# Patient Record
Sex: FEMALE | Race: WHITE | Employment: OTHER | URBAN - METROPOLITAN AREA
[De-identification: names, ages, dates, MRNs, and addresses within clinical notes are randomized per-mention and may not be internally consistent; named-entity substitution may affect disease eponyms.]

---

## 2017-07-19 ENCOUNTER — HOSPITAL ENCOUNTER (INPATIENT)
Age: 82
LOS: 6 days | Discharge: NURSING, REHAB OR LONG-TERM CARE | DRG: 065 | End: 2017-07-25
Attending: EMERGENCY MEDICINE | Admitting: HOSPITALIST
Payer: MEDICARE

## 2017-07-19 ENCOUNTER — APPOINTMENT (OUTPATIENT)
Dept: CT IMAGING | Age: 82
DRG: 065 | End: 2017-07-19
Attending: EMERGENCY MEDICINE
Payer: MEDICARE

## 2017-07-19 DIAGNOSIS — S06.5XAA SUBDURAL HEMATOMA: Primary | ICD-10-CM

## 2017-07-19 DIAGNOSIS — F03.90 DEMENTIA WITHOUT BEHAVIORAL DISTURBANCE, UNSPECIFIED DEMENTIA TYPE: ICD-10-CM

## 2017-07-19 DIAGNOSIS — R53.81 DEBILITY: ICD-10-CM

## 2017-07-19 DIAGNOSIS — N30.00 ACUTE CYSTITIS WITHOUT HEMATURIA: ICD-10-CM

## 2017-07-19 DIAGNOSIS — G93.40 ACUTE ENCEPHALOPATHY: ICD-10-CM

## 2017-07-19 DIAGNOSIS — Z71.89 GOALS OF CARE, COUNSELING/DISCUSSION: ICD-10-CM

## 2017-07-19 PROBLEM — I61.9 ICH (INTRACEREBRAL HEMORRHAGE) (HCC): Status: ACTIVE | Noted: 2017-07-19

## 2017-07-19 LAB
ABO + RH BLD: NORMAL
ALBUMIN SERPL BCP-MCNC: 3.1 G/DL (ref 3.5–5)
ALBUMIN/GLOB SERPL: 0.9 {RATIO} (ref 1.1–2.2)
ALP SERPL-CCNC: 90 U/L (ref 45–117)
ALT SERPL-CCNC: 11 U/L (ref 12–78)
AMPHET UR QL SCN: NEGATIVE
ANION GAP BLD CALC-SCNC: 7 MMOL/L (ref 5–15)
APPEARANCE UR: CLEAR
APTT PPP: 25.4 SEC (ref 22.1–32.5)
ARTERIAL PATENCY WRIST A: YES
AST SERPL W P-5'-P-CCNC: 12 U/L (ref 15–37)
ATRIAL RATE: 61 BPM
BACTERIA URNS QL MICRO: NEGATIVE /HPF
BARBITURATES UR QL SCN: NEGATIVE
BASE DEFICIT BLD-SCNC: 2 MMOL/L
BASOPHILS # BLD AUTO: 0 K/UL (ref 0–0.1)
BASOPHILS # BLD: 1 % (ref 0–1)
BDY SITE: ABNORMAL
BENZODIAZ UR QL: NEGATIVE
BILIRUB SERPL-MCNC: 1 MG/DL (ref 0.2–1)
BILIRUB UR QL: NEGATIVE
BLOOD GROUP ANTIBODIES SERPL: NORMAL
BUN SERPL-MCNC: 24 MG/DL (ref 6–20)
BUN/CREAT SERPL: 20 (ref 12–20)
CALCIUM SERPL-MCNC: 8.5 MG/DL (ref 8.5–10.1)
CALCULATED P AXIS, ECG09: 79 DEGREES
CALCULATED R AXIS, ECG10: -11 DEGREES
CALCULATED T AXIS, ECG11: 142 DEGREES
CANNABINOIDS UR QL SCN: NEGATIVE
CHLORIDE SERPL-SCNC: 104 MMOL/L (ref 97–108)
CK SERPL-CCNC: 61 U/L (ref 26–192)
CO2 SERPL-SCNC: 28 MMOL/L (ref 21–32)
COCAINE UR QL SCN: NEGATIVE
COLOR UR: ABNORMAL
CREAT SERPL-MCNC: 1.19 MG/DL (ref 0.55–1.02)
DIAGNOSIS, 93000: NORMAL
DRUG SCRN COMMENT,DRGCM: NORMAL
EOSINOPHIL # BLD: 0.3 K/UL (ref 0–0.4)
EOSINOPHIL NFR BLD: 4 % (ref 0–7)
EPITH CASTS URNS QL MICRO: ABNORMAL /LPF
ERYTHROCYTE [DISTWIDTH] IN BLOOD BY AUTOMATED COUNT: 14.6 % (ref 11.5–14.5)
GAS FLOW.O2 O2 DELIVERY SYS: ABNORMAL L/MIN
GLOBULIN SER CALC-MCNC: 3.5 G/DL (ref 2–4)
GLUCOSE SERPL-MCNC: 83 MG/DL (ref 65–100)
GLUCOSE UR STRIP.AUTO-MCNC: NEGATIVE MG/DL
HCO3 BLD-SCNC: 21.6 MMOL/L (ref 22–26)
HCT VFR BLD AUTO: 38.7 % (ref 35–47)
HGB BLD-MCNC: 12.5 G/DL (ref 11.5–16)
HGB UR QL STRIP: NEGATIVE
HYALINE CASTS URNS QL MICRO: ABNORMAL /LPF (ref 0–5)
INR PPP: 1.1 (ref 0.9–1.1)
KETONES UR QL STRIP.AUTO: NEGATIVE MG/DL
LEUKOCYTE ESTERASE UR QL STRIP.AUTO: ABNORMAL
LYMPHOCYTES # BLD AUTO: 39 % (ref 12–49)
LYMPHOCYTES # BLD: 2.8 K/UL (ref 0.8–3.5)
MCH RBC QN AUTO: 28.7 PG (ref 26–34)
MCHC RBC AUTO-ENTMCNC: 32.3 G/DL (ref 30–36.5)
MCV RBC AUTO: 88.8 FL (ref 80–99)
METHADONE UR QL: NEGATIVE
MONOCYTES # BLD: 0.8 K/UL (ref 0–1)
MONOCYTES NFR BLD AUTO: 11 % (ref 5–13)
NEUTS SEG # BLD: 3.4 K/UL (ref 1.8–8)
NEUTS SEG NFR BLD AUTO: 45 % (ref 32–75)
NITRITE UR QL STRIP.AUTO: NEGATIVE
OPIATES UR QL: NEGATIVE
P-R INTERVAL, ECG05: 232 MS
PCO2 BLD: 30.5 MMHG (ref 35–45)
PCP UR QL: NEGATIVE
PH BLD: 7.46 [PH] (ref 7.35–7.45)
PH UR STRIP: 5.5 [PH] (ref 5–8)
PLATELET # BLD AUTO: 281 K/UL (ref 150–400)
PO2 BLD: 85 MMHG (ref 80–100)
POTASSIUM SERPL-SCNC: 3.4 MMOL/L (ref 3.5–5.1)
PROT SERPL-MCNC: 6.6 G/DL (ref 6.4–8.2)
PROT UR STRIP-MCNC: NEGATIVE MG/DL
PROTHROMBIN TIME: 11.6 SEC (ref 9–11.1)
Q-T INTERVAL, ECG07: 444 MS
QRS DURATION, ECG06: 90 MS
QTC CALCULATION (BEZET), ECG08: 446 MS
RBC # BLD AUTO: 4.36 M/UL (ref 3.8–5.2)
RBC #/AREA URNS HPF: ABNORMAL /HPF (ref 0–5)
SAO2 % BLD: 97 % (ref 92–97)
SODIUM SERPL-SCNC: 139 MMOL/L (ref 136–145)
SP GR UR REFRACTOMETRY: 1.02 (ref 1–1.03)
SPECIMEN EXP DATE BLD: NORMAL
SPECIMEN TYPE: ABNORMAL
THERAPEUTIC RANGE,PTTT: NORMAL SECS (ref 58–77)
TOTAL RESP. RATE, ITRR: 24
TROPONIN I SERPL-MCNC: <0.04 NG/ML
UA: UC IF INDICATED,UAUC: ABNORMAL
UROBILINOGEN UR QL STRIP.AUTO: 0.2 EU/DL (ref 0.2–1)
VENTRICULAR RATE, ECG03: 61 BPM
WBC # BLD AUTO: 7.3 K/UL (ref 3.6–11)
WBC URNS QL MICRO: ABNORMAL /HPF (ref 0–4)

## 2017-07-19 PROCEDURE — 65610000006 HC RM INTENSIVE CARE

## 2017-07-19 PROCEDURE — 82550 ASSAY OF CK (CPK): CPT | Performed by: EMERGENCY MEDICINE

## 2017-07-19 PROCEDURE — 80053 COMPREHEN METABOLIC PANEL: CPT | Performed by: EMERGENCY MEDICINE

## 2017-07-19 PROCEDURE — 74011636320 HC RX REV CODE- 636/320: Performed by: EMERGENCY MEDICINE

## 2017-07-19 PROCEDURE — 36600 WITHDRAWAL OF ARTERIAL BLOOD: CPT

## 2017-07-19 PROCEDURE — 74011250636 HC RX REV CODE- 250/636: Performed by: HOSPITALIST

## 2017-07-19 PROCEDURE — 87086 URINE CULTURE/COLONY COUNT: CPT | Performed by: EMERGENCY MEDICINE

## 2017-07-19 PROCEDURE — 84484 ASSAY OF TROPONIN QUANT: CPT | Performed by: EMERGENCY MEDICINE

## 2017-07-19 PROCEDURE — 70450 CT HEAD/BRAIN W/O DYE: CPT

## 2017-07-19 PROCEDURE — 94762 N-INVAS EAR/PLS OXIMTRY CONT: CPT

## 2017-07-19 PROCEDURE — 77030011943

## 2017-07-19 PROCEDURE — 85025 COMPLETE CBC W/AUTO DIFF WBC: CPT | Performed by: EMERGENCY MEDICINE

## 2017-07-19 PROCEDURE — 36415 COLL VENOUS BLD VENIPUNCTURE: CPT | Performed by: HOSPITALIST

## 2017-07-19 PROCEDURE — 70460 CT HEAD/BRAIN W/DYE: CPT

## 2017-07-19 PROCEDURE — 74011000258 HC RX REV CODE- 258: Performed by: EMERGENCY MEDICINE

## 2017-07-19 PROCEDURE — 74011250636 HC RX REV CODE- 250/636: Performed by: EMERGENCY MEDICINE

## 2017-07-19 PROCEDURE — 86900 BLOOD TYPING SEROLOGIC ABO: CPT | Performed by: EMERGENCY MEDICINE

## 2017-07-19 PROCEDURE — 76450000000

## 2017-07-19 PROCEDURE — 74011000250 HC RX REV CODE- 250: Performed by: EMERGENCY MEDICINE

## 2017-07-19 PROCEDURE — 93005 ELECTROCARDIOGRAM TRACING: CPT

## 2017-07-19 PROCEDURE — 85730 THROMBOPLASTIN TIME PARTIAL: CPT | Performed by: EMERGENCY MEDICINE

## 2017-07-19 PROCEDURE — 99285 EMERGENCY DEPT VISIT HI MDM: CPT

## 2017-07-19 PROCEDURE — 85610 PROTHROMBIN TIME: CPT | Performed by: EMERGENCY MEDICINE

## 2017-07-19 PROCEDURE — 82803 BLOOD GASES ANY COMBINATION: CPT

## 2017-07-19 PROCEDURE — 81001 URINALYSIS AUTO W/SCOPE: CPT | Performed by: EMERGENCY MEDICINE

## 2017-07-19 PROCEDURE — 80307 DRUG TEST PRSMV CHEM ANLYZR: CPT | Performed by: EMERGENCY MEDICINE

## 2017-07-19 PROCEDURE — 51701 INSERT BLADDER CATHETER: CPT

## 2017-07-19 RX ORDER — MENTHOL AND ZINC OXIDE .44; 20.625 G/100G; G/100G
OINTMENT TOPICAL 3 TIMES DAILY
COMMUNITY
Start: 2017-03-24 | End: 2019-09-19 | Stop reason: DRUGHIGH

## 2017-07-19 RX ORDER — LOPERAMIDE HYDROCHLORIDE 2 MG/1
2 CAPSULE ORAL
COMMUNITY
End: 2019-09-19

## 2017-07-19 RX ORDER — MIRTAZAPINE 15 MG/1
7.5 TABLET, FILM COATED ORAL
COMMUNITY
End: 2019-09-19 | Stop reason: DRUGHIGH

## 2017-07-19 RX ORDER — DONEPEZIL HYDROCHLORIDE 10 MG/1
10 TABLET, FILM COATED ORAL
COMMUNITY
End: 2019-09-19

## 2017-07-19 RX ORDER — ATORVASTATIN CALCIUM 10 MG/1
10 TABLET, FILM COATED ORAL EVERY EVENING
COMMUNITY
End: 2019-09-19

## 2017-07-19 RX ORDER — ACETAMINOPHEN 325 MG/1
650 TABLET ORAL
COMMUNITY

## 2017-07-19 RX ORDER — SODIUM CHLORIDE 0.9 % (FLUSH) 0.9 %
10 SYRINGE (ML) INJECTION
Status: COMPLETED | OUTPATIENT
Start: 2017-07-19 | End: 2017-07-19

## 2017-07-19 RX ORDER — LOSARTAN POTASSIUM 100 MG/1
100 TABLET ORAL DAILY
COMMUNITY
Start: 2017-01-26 | End: 2019-09-19 | Stop reason: DRUGHIGH

## 2017-07-19 RX ORDER — POTASSIUM CHLORIDE 7.45 MG/ML
10 INJECTION INTRAVENOUS
Status: COMPLETED | OUTPATIENT
Start: 2017-07-19 | End: 2017-07-19

## 2017-07-19 RX ORDER — SODIUM CHLORIDE 9 MG/ML
75 INJECTION, SOLUTION INTRAVENOUS CONTINUOUS
Status: DISPENSED | OUTPATIENT
Start: 2017-07-19 | End: 2017-07-20

## 2017-07-19 RX ORDER — FOLIC ACID 1 MG/1
1 TABLET ORAL DAILY
COMMUNITY
Start: 2017-01-26 | End: 2019-09-19

## 2017-07-19 RX ORDER — LORAZEPAM 0.5 MG/1
0.5 TABLET ORAL
Status: ON HOLD | COMMUNITY
End: 2017-07-24

## 2017-07-19 RX ORDER — CITALOPRAM 20 MG/1
20 TABLET, FILM COATED ORAL EVERY EVENING
COMMUNITY
End: 2017-07-25

## 2017-07-19 RX ORDER — SODIUM CHLORIDE 0.9 % (FLUSH) 0.9 %
5-10 SYRINGE (ML) INJECTION AS NEEDED
Status: DISCONTINUED | OUTPATIENT
Start: 2017-07-19 | End: 2017-07-25 | Stop reason: HOSPADM

## 2017-07-19 RX ORDER — TRAZODONE HYDROCHLORIDE 50 MG/1
25 TABLET ORAL
COMMUNITY
End: 2019-09-19

## 2017-07-19 RX ORDER — SODIUM CHLORIDE 0.9 % (FLUSH) 0.9 %
5-10 SYRINGE (ML) INJECTION EVERY 8 HOURS
Status: DISCONTINUED | OUTPATIENT
Start: 2017-07-19 | End: 2017-07-22

## 2017-07-19 RX ADMIN — Medication 10 ML: at 22:00

## 2017-07-19 RX ADMIN — POTASSIUM CHLORIDE 10 MEQ: 10 INJECTION, SOLUTION INTRAVENOUS at 16:18

## 2017-07-19 RX ADMIN — POTASSIUM CHLORIDE 10 MEQ: 10 INJECTION, SOLUTION INTRAVENOUS at 18:43

## 2017-07-19 RX ADMIN — CEFTRIAXONE 1 G: 1 INJECTION, POWDER, FOR SOLUTION INTRAMUSCULAR; INTRAVENOUS at 15:47

## 2017-07-19 RX ADMIN — SODIUM CHLORIDE 100 ML: 900 INJECTION, SOLUTION INTRAVENOUS at 15:39

## 2017-07-19 RX ADMIN — Medication 10 ML: at 15:27

## 2017-07-19 RX ADMIN — SODIUM CHLORIDE 500 ML: 900 INJECTION, SOLUTION INTRAVENOUS at 14:57

## 2017-07-19 RX ADMIN — Medication 10 ML: at 15:39

## 2017-07-19 RX ADMIN — IOPAMIDOL 70 ML: 612 INJECTION, SOLUTION INTRAVENOUS at 15:39

## 2017-07-19 RX ADMIN — POTASSIUM CHLORIDE 10 MEQ: 10 INJECTION, SOLUTION INTRAVENOUS at 17:28

## 2017-07-19 RX ADMIN — SODIUM CHLORIDE 75 ML/HR: 900 INJECTION, SOLUTION INTRAVENOUS at 16:18

## 2017-07-19 RX ADMIN — SODIUM CHLORIDE 5 MG/HR: 900 INJECTION, SOLUTION INTRAVENOUS at 15:51

## 2017-07-19 NOTE — CONSULTS
[address] [place] [facility] [place] [address]   [address]       Name:  Chantal Gibbs   MR#:  [id number]   :  10/07/1923   Account #:  [de-identified]    Date of Consultation:  2017   Date of Adm:  2017       REASON FOR CONSULTATION: Subdural hematoma. HISTORY OF PRESENT ILLNESS: This is a 80-year-old woman who   is a resident at an assisted living facility with memory care wing. She   has a history of dementia. 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 Her son believes that was the   start of her decline. Today she was brought to the emergency room for   altered mental status. Workup included a head CT. This did show   bilateral subdural collections. They are not acute, they are subacute   and chronic without any significant mass effect. She has not had any   seizure activity. She was started on Cardene because of elevated   blood pressure and I was asked to see her in consultation. PAST MEDICAL HISTORY: Hypertension, dementia, psychiatric   disorder, hypercholesterolemia. MEDICATIONS PRIOR TO ADMISSION   1. Lipitor 10 mg every day. 2. Celexa 20 mg at bedtime. 3. Aricept 10 mg daily. 4. Folvite 1 mg every day. 5. Cozaar 100 mg every day. 6. Remeron 7.5 mg at bedtime. 7. Ativan 0.5 mg p.r.n.   8. Trazodone 25 mg p.r.n. FAMILY HISTORY: Unknown. PAST SURGICAL HISTORY: Left craniotomy, other surgeries   unknown. REVIEW OF SYSTEMS: Unobtainable from patient. ALLERGIES: NO KNOWN DRUG ALLERGIES. SOCIAL HISTORY: She is a resident at an assisted living memory   care unit, no tobacco or alcohol use. PHYSICAL EXAMINATION   VITAL SIGNS: Temperature 98.4, blood pressure 147/55, pulse is 62. GENERAL: This is a well-developed, elderly woman who is examined   lying in a hospital bed. She is able to open her eyes. She can tell me   her name. She knows her date of birth.  She does not know where she   is and she does not know today's date. She does not have any   complaints except for being cold. NEUROLOGIC: She has fluent speech. She is able to follow   commands in all 4 extremities. She has good strength throughout. No   focal neurologic deficit. SKIN: No bruising. IMAGING STUDIES: Shows a head CT that has 2 bilateral subdural   collections that are acute on chronic, without any significant mass   effect, no midline shift. Basal cisterns are open. There is sign of a   previous left craniotomy. ASSESSMENT AND PLAN: This is a 77-year-old woman who had an   altered mental status according to her living facility. She does have two   subacute chronic subdurals; however, they are not causing any mass   effect. I do not think this is causing any neurologic deficit. She may   benefit from repeat imaging in the morning. I would hold any   anticoagulation. I agree with aggressive blood pressure management. Thank you for this consultation.         MD Maryellen Noble / Nitin Bautista   D:  07/19/2017   17:28   T:  07/19/2017   19:42   Job #:  [id number]

## 2017-07-19 NOTE — ED TRIAGE NOTES
Triage: Pt arrives via [facility] from [facility] with AMS and bradycardia in the 40-50s with Sinus rhythm. BG 85. Hx of  Dementia, HTN, depression and anxiety. Intermittently responds to pain. Temp on arrival 97.8 orally.

## 2017-07-19 NOTE — CONSULTS
Palliative Medicine Consult  Phan: [phone]    Patient Name: Terrence Sebastian  YOB: 1923    Date of Initial Consult: 17  Reason for Consult: care decisions  Requesting Provider: Claudell Song   Primary Care Physician: Annika Michele MD      SUMMARY:   Terrence Sebastian is a 80 y.o. with a past history of dementia, htn, psychiatric disorder, who was admitted on 2017 from NH with a diagnosis of AMS, found to have bilateral chronic subdural collections, superimposed acute right subdural hematoma vs prominent cortical vein. Current medical issues leading to Palliative Medicine involvement include: subdural collections, dementia, debility, goals of care. PALLIATIVE DIAGNOSES:   1. Acute encephalopathy  2. Dementia  3. Debility  4. Goals of care       PLAN:   1. Pt is complaining of \"pain all over\", but unreliable historian, reassess, consider scheduled tylenol first line  2. Pt lacks capacity for major medical decisions  3. Discussed pt and condition with son Marin Biggs, including bilateral subdural collections, possible small acute right subdural (but also possible no acute component), involvement of neurosurgery service and no surgical indication at this point, plan for close bp and neuro monitoring  4. We discussed pt's recent history; she's lived in [address] [place] [address],[address] for 6 or 7 months, overall has been pretty stable; she ambulates with walker, occ needs assist with ADLs, been eating fairly well with no weight loss noted; she is usually able to recognize family, but may not be oriented to place / time, often thinks  family members still alive  11. We discussed about advance care planning; pt has not completed AMD or other documents such as DNR in past  6. Discussed with him about code status, risks / benefits / alternatives to full code;  Jamaica Soto feels sure that pt would not want CPR and that his brother, Armando Vicente, would be in agreement; he is OK with me updating code status to DNR, order entered, and eventually pt will need dDNR completed  7. Agreed to follow up with Moises tomorrow for updates  8. Initial consult note routed to primary continuity provider  9. Communicated plan of care with: Palliative IDT       GOALS OF CARE / TREATMENT PREFERENCES:   [====Goals of Care====]  GOALS OF CARE:  Patient / health care proxy stated goals: TBD      TREATMENT PREFERENCES:   Code Status: Full Code    Advance Care Planning:  No flowsheet data found. Other:    The palliative care team has discussed with patient / health care proxy about goals of care / treatment preferences for patient.  [====Goals of Care====]         HISTORY:     History obtained from: pt, chart, son    CHIEF COMPLAINT: \"I'm cold\"    HPI/SUBJECTIVE:    The patient is:   [x] Verbal and participatory  [] Non-participatory due to:   80 yof with above history and current issues. On assessment, pt perseverating saying \"I'm cold\". She knows her name and family member's names. She indicates she is having pain \"all over\" but is not able to elaborate. History limited by pt AMS. Clinical Pain Assessment (nonverbal scale for severity on nonverbal patients):   [++++ Clinical Pain Assessment++++]  [++++Pain Severity++++]: Pain: 5  [++++Pain Character++++]: unknown  [++++Pain Duration++++]: unknown  [++++Pain Effect++++]: unknown  [++++Pain Factors++++]: unknown  [++++Pain Frequency++++]: unknown  [++++Pain Location++++]: \"all over\"  [++++ Clinical Pain Assessment++++]     FUNCTIONAL ASSESSMENT:     Palliative Performance Scale (PPS):  PPS: 30       PSYCHOSOCIAL/SPIRITUAL SCREENING:     Advance Care Planning:  No flowsheet data found.      Any spiritual / Quaker concerns:  [] Yes /  [] No    Caregiver Burnout:  [] Yes /  [x] No /  [] No Caregiver Present      Anticipatory grief assessment:   [] Normal  / [] Maladaptive       ESAS Anxiety:      ESAS Depression:       cannot assess due to pt factors for multiple above     REVIEW OF SYSTEMS:     Positive and pertinent negative findings in ROS are noted above in HPI. The following systems were [] reviewed / [x] unable to be reviewed as noted in HPI  Other findings are noted below. Systems: constitutional, ears/nose/mouth/throat, respiratory, gastrointestinal, genitourinary, musculoskeletal, integumentary, neurologic, psychiatric, endocrine. Positive findings noted below. Modified ESAS Completed by: provider   Fatigue: 5 Drowsiness: 5     Pain: 5                                PHYSICAL EXAM:     From RN flowsheet:  Wt Readings from Last 3 Encounters:   07/19/17 127 lb 14.4 oz (58 kg)     Blood pressure 147/55, pulse 62, temperature 98.4 °F (36.9 °C), resp. rate 26, height 5' 6\" (1.676 m), weight 127 lb 14.4 oz (58 kg), SpO2 98 %. Pain Scale 1: Adult Nonverbal Pain Scale                    Last bowel movement, if known:     Constitutional: restless, perseverates saying \"I'm cold\"  Eyes: pupils equal, anicteric  ENMT: no nasal discharge, dry mucous membranes  Cardiovascular: regular rhythm, distal pulses intact  Respiratory: breathing not labored, symmetric  Gastrointestinal: soft non-tender, +bowel sounds  Musculoskeletal: no deformity, no tenderness to palpation  Skin: warm, dry  Neurologic: following commands, moving all extremities but generalized weakness noted  Psychiatric: restless, cannot assess due to pt factors for remainder  Other:       HISTORY:     Principal Problem:    SDH (subdural hematoma) ([facility]) (7/19/2017)    Active Problems:    ICH (intracerebral hemorrhage) ([facility] [address] (7/19/2017)      Past Medical History:   Diagnosis Date    Dementia 2017    Hypertension     Psychiatric disorder       History reviewed. No pertinent surgical history. History reviewed. No pertinent family history. History reviewed, no pertinent family history.   Social History   Substance Use Topics    Smoking status: Not on file    Smokeless tobacco: Not on file    Alcohol use No     No Known Allergies   Current Facility-Administered Medications   Medication Dose Route Frequency    niCARdipine (CARDENE) 25 mg in 0.9% sodium chloride 250 mL infusion  5-15 mg/hr IntraVENous TITRATE    sodium chloride (NS) flush 5-10 mL  5-10 mL IntraVENous Q8H    sodium chloride (NS) flush 5-10 mL  5-10 mL IntraVENous PRN    potassium chloride 10 mEq in 100 ml IVPB  10 mEq IntraVENous Q1H    0.9% sodium chloride infusion  75 mL/hr IntraVENous CONTINUOUS     Current Outpatient Prescriptions   Medication Sig    atorvastatin (LIPITOR) 10 mg tablet Take 10 mg by mouth every evening. Indications: hyperlipidemia    Menthol-Zinc Oxide (CALMOSEPTINE) 0.44-20.6 % oint Apply  to affected area three (3) times daily.  citalopram (CELEXA) 20 mg tablet Take 20 mg by mouth every evening. Indications: ANXIETY WITH DEPRESSION    donepezil (ARICEPT) 10 mg tablet Take 10 mg by mouth nightly. 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  losartan (COZAAR) 100 mg tablet Take 100 mg by mouth daily. Indications: hypertension    mirtazapine (REMERON) 15 mg tablet Take 7.5 mg by mouth nightly. Indications: major depressive disorder    LORazepam (ATIVAN) 0.5 mg tablet Take 0.5 mg by mouth two (2) times daily as needed (agitation).  traZODone (DESYREL) 50 mg tablet Take 25 mg by mouth nightly as needed. Indications: insomnia associated with depression    acetaminophen (TYLENOL) 325 mg tablet Take 650 mg by mouth every four (4) hours as needed for Pain. Indications: Pain    loperamide (ANTI-DIARRHEAL, LOPERAMIDE,) 2 mg capsule Take 2 mg by mouth four (4) times daily as needed for Diarrhea.           LAB AND IMAGING FINDINGS:     Lab Results   Component Value Date/Time    WBC 7.3 07/19/2017 01:47 PM    HGB 12.5 07/19/2017 01:47 PM    PLATELET 844 52/67/7666 01:47 PM     Lab Results   Component Value Date/Time    Sodium 139 07/19/2017 01:47 PM    Potassium 3.4 07/19/2017 01:47 PM    Chloride 104 07/19/2017 01:47 PM    CO2 28 07/19/2017 01:47 PM    BUN 24 07/19/2017 01:47 PM    Creatinine 1.19 07/19/2017 01:47 PM    Calcium 8.5 07/19/2017 01:47 PM      Lab Results   Component Value Date/Time    AST (SGOT) 12 07/19/2017 01:47 PM    Alk. phosphatase 90 07/19/2017 01:47 PM    Protein, total 6.6 07/19/2017 01:47 PM    Albumin 3.1 07/19/2017 01:47 PM    Globulin 3.5 07/19/2017 01:47 PM     Lab Results   Component Value Date/Time    INR 1.1 07/19/2017 01:47 PM    Prothrombin time 11.6 07/19/2017 01:47 PM    aPTT 25.4 07/19/2017 01:47 PM      No results found for: IRON, FE, TIBC, IBCT, PSAT, FERR   No results found for: PH, PCO2, PO2  No components found for: GLPOC   No results found for: CPK, CKMB             Total time:   Counseling / coordination time, spent as noted above:   > 50% counseling / coordination?:     Prolonged service was provided for  []30 min   []75 min in face to face time in the presence of the patient, spent as noted above. Time Start:   Time End:   Note: this can only be billed with 91652 (initial) or 17171 (follow up). If multiple start / stop times, list each separately.

## 2017-07-19 NOTE — CONSULTS
Full note to follow. Head Ct with chronic subdural collections. No surgical intervention needed. Agree with BP control.   Repeat CT in am.

## 2017-07-19 NOTE — PROGRESS NOTES
Admission Medication Reconciliation:    Information obtained from: Medication List provided by [facility] (dated 7/19/17)    Significant PMH/Disease States:   Past Medical History:   Diagnosis Date    Dementia 2017    Hypertension     Psychiatric disorder        Chief Complaint for this Admission:  AMS and bradycardia    Allergies:  Review of patient's allergies indicates no known allergies. Prior to Admission Medications:   Prior to Admission Medications   Prescriptions Last Dose Informant Patient Reported? Taking? LORazepam (ATIVAN) 0.5 mg tablet   Yes Yes   Sig: Take 0.5 mg by mouth two (2) times daily as needed (agitation). Menthol-Zinc Oxide (CALMOSEPTINE) 0.44-20.6 % oint   Yes Yes   Sig: Apply  to affected area three (3) times daily. acetaminophen (TYLENOL) 325 mg tablet   Yes Yes   Sig: Take 650 mg by mouth every four (4) hours as needed for Pain. Indications: Pain   atorvastatin (LIPITOR) 10 mg tablet   Yes Yes   Sig: Take 10 mg by mouth every evening. Indications: hyperlipidemia   citalopram (CELEXA) 20 mg tablet   Yes Yes   Sig: Take 20 mg by mouth every evening. Indications: ANXIETY WITH DEPRESSION   donepezil (ARICEPT) 10 mg tablet   Yes Yes   Sig: Take 10 mg by mouth nightly. 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 loperamide (ANTI-DIARRHEAL, LOPERAMIDE,) 2 mg capsule   Yes Yes   Sig: Take 2 mg by mouth four (4) times daily as needed for Diarrhea.   losartan (COZAAR) 100 mg tablet   Yes Yes   Sig: Take 100 mg by mouth daily. Indications: hypertension   mirtazapine (REMERON) 15 mg tablet   Yes Yes   Sig: Take 7.5 mg by mouth nightly. Indications: major depressive disorder   traZODone (DESYREL) 50 mg tablet   Yes Yes   Sig: Take 25 mg by mouth nightly as needed.  Indications: insomnia associated with depression      Facility-Administered Medications: None Comments/Recommendations:   N/A    [employer] CHERYL  Candidate 2018

## 2017-07-19 NOTE — H&P
History & Physical    Primary Care Provider: Annika Michele MD  Source of Information: ER chart    History of Presenting Illness:   Anjum De Leon is a 80 y.o. female who presents with confusion per ER chart 80 y.o. female with past medical history significant for dementia, HTN, and psychiatric disorder who presents from assisted living facility via EMS with chief complaint of AMS. Per EMS the patient was found altered by staff at the facility that she lives at this afternoon and was last seen at her baseline yesterday evening no toher history could be obtained and CT scan of head showed - Bilateral chronic subdural hygromas. Superimposed small right acute subdural hematoma versus prominent cortical vein Contrast CT recommended. Review of Systems:  Pertinent items are noted in the History of Present Illness. Past Medical History:   Diagnosis Date    Dementia 2017    Hypertension     Psychiatric disorder       History reviewed. No pertinent surgical history. Prior to Admission medications    Medication Sig Start Date End Date Taking? Authorizing Provider   atorvastatin (LIPITOR) 10 mg tablet Take 10 mg by mouth every evening. Indications: hyperlipidemia   Yes Annika Michele MD   Menthol-Zinc Oxide (CALMOSEPTINE) 0.44-20.6 % oint Apply  to affected area three (3) times daily. 3/24/17  Yes Annika Michele MD   citalopram (CELEXA) 20 mg tablet Take 20 mg by mouth every evening. Indications: ANXIETY WITH DEPRESSION   Yes Annika Michele MD   donepezil (ARICEPT) 10 mg tablet Take 10 mg by mouth nightly. 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 1/26/17  Yes Annika Michele MD   losartan (COZAAR) 100 mg tablet Take 100 mg by mouth daily. Indications: hypertension 1/26/17  Yes Annika Michele MD   mirtazapine (REMERON) 15 mg tablet Take 7.5 mg by mouth nightly.  Indications: major depressive disorder Yes Annika Michele MD   LORazepam (ATIVAN) 0.5 mg tablet Take 0.5 mg by mouth two (2) times daily as needed (agitation). Yes Annika Michele MD   traZODone (DESYREL) 50 mg tablet Take 25 mg by mouth nightly as needed. Indications: insomnia associated with depression   Yes Annika Michele MD   acetaminophen (TYLENOL) 325 mg tablet Take 650 mg by mouth every four (4) hours as needed for Pain. Indications: Pain   Yes Annika Michele MD   loperamide (ANTI-DIARRHEAL, LOPERAMIDE,) 2 mg capsule Take 2 mg by mouth four (4) times daily as needed for Diarrhea. Yes Historical Provider     No Known Allergies   History reviewed. No pertinent family history. SOCIAL HISTORY:  Patient resides:  Independently    Assisted Living x   SNF    With family care       Smoking history:   None x   Former    Chronic      Alcohol history:   None x   Social    Chronic      Ambulates:   Independently x   w/cane    w/walker    w/wc    CODE STATUS:  DNR    Full x   Other      Objective:     Physical Exam:     Visit Vitals    /74 (BP 1 Location: Left arm, BP Patient Position: At rest)    Pulse (!) 55    Temp 97.5 °F (36.4 °C)    Resp 18    Ht 5' 6\" (1.676 m)    Wt 58 kg (127 lb 14.4 oz)    BMI 20.64 kg/m2           General:  confused   Head:  Normocephalic, without obvious abnormality   Eyes:   PERRL, EOMs intact. Nose: Nares normal. Septum midline. Mucosa normal.    Throat: Lips, mucosa, and tongue normal. Teeth and gums normal.   Neck: Supple, symmetrical, trachea midline, no carotid bruit and no JVD. Lungs:   Clear to auscultation bilaterally. Heart:  Regular rate and rhythm, S1, S2 normal, no murmur, click, rub or gallop. Abdomen:   Soft, non-tender. Bowel sounds normal.    Extremities: Extremities normal, atraumatic, no cyanosis or edema. Pulses: 2+ and symmetric all extremities.    Skin: Skin color, texture, turgor normal. No rashes or lesions   Neurologic: AAOX 1/0         EKG:  Sinus rhythm with 1st degree AV block premature atrial complexes      Data Review:     Recent Days:  Recent Labs      07/19/17   1347   WBC  7.3   HGB  12.5   HCT  38.7   PLT  281     Recent Labs      07/19/17   1347   NA  139   K  3.4*   CL  104   CO2  28   GLU  83   BUN  24*   CREA  1.19*   CA  8.5   ALB  3.1*   TBILI  1.0   SGOT  12*   ALT  11*     No results for input(s): PH, PCO2, PO2, HCO3, FIO2 in the last 72 hours. 24 Hour Results:  Recent Results (from the past 24 hour(s))   EKG, 12 LEAD, INITIAL    Collection Time: 07/19/17  1:32 PM   Result Value Ref Range    Ventricular Rate 61 BPM    Atrial Rate 61 BPM    P-R Interval 232 ms    QRS Duration 90 ms    Q-T Interval 444 ms    QTC Calculation (Bezet) 446 ms    Calculated P Axis 79 degrees    Calculated R Axis -11 degrees    Calculated T Axis 142 degrees    Diagnosis       ** Poor data quality, interpretation may be adversely affected  Sinus rhythm with 1st degree AV block premature atrial complexes  T wave abnormality, consider lateral ischemia  No previous ECGs available  Confirmed by Rosi Elam M.D., [facility] ([id number]) on 7/19/2017 2:53:28 PM     CBC WITH AUTOMATED DIFF    Collection Time: 07/19/17  1:47 PM   Result Value Ref Range    WBC 7.3 3.6 - 11.0 K/uL    RBC 4.36 3.80 - 5.20 M/uL    HGB 12.5 11.5 - 16.0 g/dL    HCT 38.7 35.0 - 47.0 %    MCV 88.8 80.0 - 99.0 FL    MCH 28.7 26.0 - 34.0 PG    MCHC 32.3 30.0 - 36.5 g/dL    RDW 14.6 (H) 11.5 - 14.5 %    PLATELET 612 735 - 308 K/uL    NEUTROPHILS 45 32 - 75 %    LYMPHOCYTES 39 12 - 49 %    MONOCYTES 11 5 - 13 %    EOSINOPHILS 4 0 - 7 %    BASOPHILS 1 0 - 1 %    ABS. NEUTROPHILS 3.4 1.8 - 8.0 K/UL    ABS. LYMPHOCYTES 2.8 0.8 - 3.5 K/UL    ABS. MONOCYTES 0.8 0.0 - 1.0 K/UL    ABS. EOSINOPHILS 0.3 0.0 - 0.4 K/UL    ABS.  BASOPHILS 0.0 0.0 - 0.1 K/UL   METABOLIC PANEL, COMPREHENSIVE    Collection Time: 07/19/17  1:47 PM   Result Value Ref Range    Sodium 139 136 - 145 mmol/L    Potassium 3.4 (L) 3.5 - 5.1 mmol/L    Chloride 104 97 - 108 mmol/L    CO2 28 21 - 32 mmol/L    Anion gap 7 5 - 15 mmol/L    Glucose 83 65 - 100 mg/dL    BUN 24 (H) 6 - 20 MG/DL    Creatinine 1.19 (H) 0.55 - 1.02 MG/DL    BUN/Creatinine ratio 20 12 - 20      GFR est AA 51 (L) >60 ml/min/1.73m2    GFR est non-AA 42 (L) >60 ml/min/1.73m2    Calcium 8.5 8.5 - 10.1 MG/DL    Bilirubin, total 1.0 0.2 - 1.0 MG/DL    ALT (SGPT) 11 (L) 12 - 78 U/L    AST (SGOT) 12 (L) 15 - 37 U/L    Alk.  phosphatase 90 45 - 117 U/L    Protein, total 6.6 6.4 - 8.2 g/dL    Albumin 3.1 (L) 3.5 - 5.0 g/dL    Globulin 3.5 2.0 - 4.0 g/dL    A-G Ratio 0.9 (L) 1.1 - 2.2     CK W/ REFLX CKMB    Collection Time: 07/19/17  1:47 PM   Result Value Ref Range    CK 61 26 - 192 U/L   TROPONIN I    Collection Time: 07/19/17  1:47 PM   Result Value Ref Range    Troponin-I, Qt. <0.04 <0.05 ng/mL   URINALYSIS W/ REFLEX CULTURE    Collection Time: 07/19/17  1:47 PM   Result Value Ref Range    Color YELLOW/STRAW      Appearance CLEAR CLEAR      Specific gravity 1.016 1.003 - 1.030      pH (UA) 5.5 5.0 - 8.0      Protein NEGATIVE  NEG mg/dL    Glucose NEGATIVE  NEG mg/dL    Ketone NEGATIVE  NEG mg/dL    Bilirubin NEGATIVE  NEG      Blood NEGATIVE  NEG      Urobilinogen 0.2 0.2 - 1.0 EU/dL    Nitrites NEGATIVE  NEG      Leukocyte Esterase SMALL (A) NEG      WBC 5-10 0 - 4 /hpf    RBC 5-10 0 - 5 /hpf    Epithelial cells FEW FEW /lpf    Bacteria NEGATIVE  NEG /hpf    UA:UC IF INDICATED URINE CULTURE ORDERED (A) CNI      Hyaline cast 2-5 0 - 5 /lpf   DRUG SCREEN, URINE    Collection Time: 07/19/17  1:47 PM   Result Value Ref Range    AMPHETAMINES NEGATIVE  NEG      BARBITURATES NEGATIVE  NEG      BENZODIAZEPINE NEGATIVE  NEG      COCAINE NEGATIVE  NEG      METHADONE NEGATIVE  NEG      OPIATES NEGATIVE  NEG      PCP(PHENCYCLIDINE) NEGATIVE  NEG      THC (TH-CANNABINOL) NEGATIVE  NEG      Drug screen comment (NOTE)    POC G3 - PUL    Collection Time: 07/19/17  3:18 PM   Result Value Ref Range    pH (POC) 7.459 (H) 7.35 - 7.45      pCO2 (POC) 30.5 (L) 35.0 - 45.0 MMHG    pO2 (POC) 85 80 - 100 MMHG    HCO3 (POC) 21.6 (L) 22 - 26 MMOL/L    sO2 (POC) 97 92 - 97 %    Base deficit (POC) 2 mmol/L    Site LEFT BRACHIAL      Device: ROOM AIR      Allens test (POC) YES      Specimen type (POC) ARTERIAL      Total resp. rate 24           Imaging:     Assessment:     Principal Problem:    SDH (subdural hematoma) ([facility]) (7/19/2017)    Active Problems:    ICH (intracerebral hemorrhage) ([facility] [address] (7/19/2017)    1. Acute sub dural with hygroma- CT with contrast per radiology and NS consulted MGMT per NS    2. Uncontrolled HTN in the setting of acute SDH- will start nicardipine drip goal systolic around 524, hold home anti HTN medications    3. Neuro checks Q2 hrs admit to ICU, keep NPO     4. Dementia- cont home meds when able supportive    5. Psychiatric dz- cont home meds when able    6. EKG showing 1 st degree block and bradycardia  - monitor on tele get ECHO  Cycle troponin    7. Given rocephin in ER for ? UTI - wbc 5-10 small leuk est will not cont rocephin    8. Hypokalemia- replete IV and check mag     9. SAMMIE - baseline unknown gentle hydration    8.  DVT ppx - SCD    Called son and informed about admission poor prognosis given age and co- morbidities, will get pall care involved , paper work says she is full code     Time spend 60 mins    Signed By: Babs Guzmán MD     July 19, 2017

## 2017-07-19 NOTE — ED NOTES
TRANSFER - OUT REPORT:    Verbal report given to Eliud Alcantara RN on Anjum De Leon  being transferred to [phone] for routine progression of care       Report consisted of patients Situation, Background, Assessment and   Recommendations(SBAR). Information from the following report(s) SBAR, MAR and Recent Results was reviewed with the receiving nurse. Lines:   Peripheral IV 07/19/17 Right Antecubital (Active)   Site Assessment Clean, dry, & intact 7/19/2017  1:43 PM   Phlebitis Assessment 0 7/19/2017  1:43 PM   Infiltration Assessment 0 7/19/2017  1:43 PM   Dressing Status Clean, dry, & intact 7/19/2017  1:43 PM       Peripheral IV 07/19/17 Left Antecubital (Active)   Site Assessment Clean, dry, & intact 7/19/2017  3:12 PM   Phlebitis Assessment 0 7/19/2017  3:12 PM   Infiltration Assessment 0 7/19/2017  3:12 PM   Dressing Status Clean, dry, & intact 7/19/2017  3:12 PM   Hub Color/Line Status Green 7/19/2017  3:12 PM       Peripheral IV 07/19/17 Right Forearm (Active)   Site Assessment Clean, dry, & intact 7/19/2017  3:51 PM   Phlebitis Assessment 0 7/19/2017  3:51 PM   Infiltration Assessment 0 7/19/2017  3:51 PM   Dressing Status Clean, dry, & intact 7/19/2017  3:51 PM   Dressing Type Tape;Transparent 7/19/2017  3:51 PM   Hub Color/Line Status Pink;Capped;Flushed;Patent 7/19/2017  3:51 PM       Peripheral IV 07/19/17 Left Forearm (Active)   Site Assessment Clean, dry, & intact 7/19/2017  4:12 PM   Phlebitis Assessment 0 7/19/2017  4:12 PM   Infiltration Assessment 0 7/19/2017  4:12 PM   Dressing Status Clean, dry, & intact 7/19/2017  4:12 PM   Hub Color/Line Status Pink 7/19/2017  4:12 PM        Opportunity for questions and clarification was provided.       Patient transported with:   Monitor  Registered Nurse

## 2017-07-19 NOTE — ED NOTES
Spoke with Pt's son Katheran Landau. Informed him that his mother was here and gave him an update on her condition. He stated that she had previous significant head injury 14 years ago. Indicated that it would be helpful if he could come to the ER to help make decisions for his mother.  He stated he couldn't come until 1700 at the earliest.

## 2017-07-19 NOTE — ED PROVIDER NOTES
HPI Comments: 80 y.o. female with past medical history significant for dementia, HTN, and psychiatric disorder who presents from assisted living facility via EMS with chief complaint of AMS. Per EMS the patient was found altered by staff at the facility that she lives at this afternoon and was last seen at her baseline yesterday evening. Per EMS the patient had a blood sugar of 85 on their arrival and a systolic BP in the 848'V. There is no further history at this time. Full history, physical exam, and ROS unable to be obtained due to:  dementia and confusion. Note written by prudence Gregorio, as dictated by Rimma Leavitt MD 1:35 PM        The history is provided by the EMS personnel. Past Medical History:   Diagnosis Date    Dementia 2017    Hypertension     Psychiatric disorder        History reviewed. No pertinent surgical history. History reviewed. No pertinent family history. Social History     Social History    Marital status: N/A     Spouse name: N/A    Number of children: N/A    Years of education: N/A     Occupational History    Not on file. Social History Main Topics    Smoking status: Not on file    Smokeless tobacco: Not on file    Alcohol use No    Drug use: Not on file    Sexual activity: Not on file     Other Topics Concern    Not on file     Social History Narrative    No narrative on file         ALLERGIES: Review of patient's allergies indicates not on file. Review of Systems   Unable to perform ROS: Mental status change       There were no vitals filed for this visit. Physical Exam   Constitutional: She appears well-developed and well-nourished. No distress. HENT:   Head: Normocephalic and atraumatic. Eyes: Conjunctivae and EOM are normal. Pupils are equal, round, and reactive to light. Neck: Normal range of motion. Cardiovascular: Normal rate, regular rhythm, normal heart sounds and intact distal pulses.   Exam reveals no friction rub. No murmur heard. Pulmonary/Chest: Effort normal and breath sounds normal. No respiratory distress. She has no wheezes. She has no rales. She exhibits no tenderness. Abdominal: Soft. Bowel sounds are normal. She exhibits no distension. There is no tenderness. There is no rebound and no guarding. Musculoskeletal: Normal range of motion. She exhibits no edema or tenderness. Neurological: She is alert. No cranial nerve deficit. Coordination normal.   Oriented to self only; lethargic but arousable   Skin: Skin is warm and dry. She is not diaphoretic. No pallor. Psychiatric: She has a normal mood and affect. Her behavior is normal.   Nursing note and vitals reviewed. MDM  Number of Diagnoses or Management Options  Acute cystitis without hematuria:   Subdural hematoma [facility]):   Diagnosis management comments: No old ekg for comparison check enzymes, electrolytes for AMS, for anemia, infection such as uti, ICH given elevated bp though some of sx may be due to medications as she received both trazodone and ativan last night (she had not taken trazodone for some time and ativan was newly started this week). Did take bp med this morning though staff is unsure how alert she was       Amount and/or Complexity of Data Reviewed  Clinical lab tests: ordered and reviewed  Tests in the radiology section of CPT®: ordered and reviewed  Obtain history from someone other than the patient: yes (facility)  Discuss the patient with other providers: yes (nsgy and hospitalist)  Independent visualization of images, tracings, or specimens: yes (ekg)    Critical Care  Total time providing critical care: 30-74 minutes    Patient Progress  Patient progress: improved    ED Course       Procedures    1:40 PM  On further information from staff at the patient's facility the patient is a 1 person assist with walker and is unable to ambulate on her own.   She also wears a left ankle monitor at night second to history of wandering at night. spoke with pts facility. took losartan at 5 am this morning; trazodone 10pm (hadnt taken it since may) and ativan at 5 pm (new this week) heart rate is usually 40-50's per facility was 68 this morning with bp 150's    EKG interpretation: (Preliminary)  Rhythm: normal sinus rhythm; and regular . Rate (approx.): 60; Axis: left axis deviation; P wave: prolonged; QRS interval: normal ; ST/T wave: T wave inverted; in  Lead: I, avl and v4-v6 no old ekg for comparison       3:10 PM  Spoke with the radiologist who advises that the patient's CT scan shows a subdural hemorrhage. Pt's systolic BP is currently in the 200's will start a Cardene drip. Will consult neurosurgery for recommendations. 3:27 PM  Spoke with dr Ben maravilla. No need for surgery. Wants bp less than 641 systolic and they will see in hospital. Will need repeat head ct    CONSULT NOTE:  3:31 PM Renee Ruiz MD spoke with Dr. Curtis Cobos, Consult for Hospitalist.  Discussed available diagnostic tests and clinical findings. He is in agreement with care plans as outlined. Dr. Curtis Cobos agrees to come see and admit the patient.       Total critical care time spend exclusive of procedures:  40 minutes    RN spoke with pts son who is aware of findings and will come to hospital

## 2017-07-19 NOTE — PROGRESS NOTES
Patient arrived by EMS to ER from [facility]. [address]. [facility] Phone # [phone]  Fax # [phone]  Dx: SDH & ICH  Son was called due to patient being a full code and due to her age patient has poor prognosis. Physician is recommending Consult with Palliative Care. Chepe Pulido spoke with patients son and made patient DNR with Palliative care.

## 2017-07-20 ENCOUNTER — APPOINTMENT (OUTPATIENT)
Dept: CT IMAGING | Age: 82
DRG: 065 | End: 2017-07-20
Attending: NEUROLOGICAL SURGERY
Payer: MEDICARE

## 2017-07-20 PROCEDURE — 70450 CT HEAD/BRAIN W/O DYE: CPT

## 2017-07-20 PROCEDURE — 93306 TTE W/DOPPLER COMPLETE: CPT

## 2017-07-20 PROCEDURE — 65660000000 HC RM CCU STEPDOWN

## 2017-07-20 PROCEDURE — 74011250637 HC RX REV CODE- 250/637: Performed by: INTERNAL MEDICINE

## 2017-07-20 RX ORDER — ATORVASTATIN CALCIUM 10 MG/1
10 TABLET, FILM COATED ORAL DAILY
Status: DISCONTINUED | OUTPATIENT
Start: 2017-07-21 | End: 2017-07-25 | Stop reason: HOSPADM

## 2017-07-20 RX ORDER — HYDRALAZINE HYDROCHLORIDE 20 MG/ML
10 INJECTION INTRAMUSCULAR; INTRAVENOUS
Status: DISCONTINUED | OUTPATIENT
Start: 2017-07-20 | End: 2017-07-25 | Stop reason: HOSPADM

## 2017-07-20 RX ORDER — DONEPEZIL HYDROCHLORIDE 10 MG/1
10 TABLET, FILM COATED ORAL
Status: DISCONTINUED | OUTPATIENT
Start: 2017-07-20 | End: 2017-07-25 | Stop reason: HOSPADM

## 2017-07-20 RX ORDER — LORAZEPAM 0.5 MG/1
0.5 TABLET ORAL 2 TIMES DAILY
Status: DISCONTINUED | OUTPATIENT
Start: 2017-07-20 | End: 2017-07-25 | Stop reason: HOSPADM

## 2017-07-20 RX ORDER — NYSTATIN 100000 [USP'U]/G
POWDER TOPICAL 2 TIMES DAILY
Status: DISCONTINUED | OUTPATIENT
Start: 2017-07-20 | End: 2017-07-25 | Stop reason: HOSPADM

## 2017-07-20 RX ORDER — MIRTAZAPINE 15 MG/1
7.5 TABLET, FILM COATED ORAL
Status: DISCONTINUED | OUTPATIENT
Start: 2017-07-20 | End: 2017-07-25 | Stop reason: HOSPADM

## 2017-07-20 RX ADMIN — DONEPEZIL HYDROCHLORIDE 10 MG: 10 TABLET, FILM COATED ORAL at 22:01

## 2017-07-20 RX ADMIN — NYSTATIN: 100000 POWDER TOPICAL at 14:37

## 2017-07-20 RX ADMIN — Medication 10 ML: at 22:01

## 2017-07-20 RX ADMIN — NYSTATIN: 100000 POWDER TOPICAL at 18:02

## 2017-07-20 RX ADMIN — Medication 10 ML: at 14:38

## 2017-07-20 RX ADMIN — MIRTAZAPINE 7.5 MG: 15 TABLET, FILM COATED ORAL at 22:01

## 2017-07-20 RX ADMIN — Medication 10 ML: at 06:00

## 2017-07-20 RX ADMIN — LORAZEPAM 0.5 MG: 0.5 TABLET ORAL at 18:00

## 2017-07-20 NOTE — PROGRESS NOTES
Hospitalist Progress Note  Rebecca Drummond MD  Internal medicine/ Hospitalist    Daily Progress Note: 2017 11:46 AM      Interval history / Subjective:   Radha Vega is a 80 y.o. female who presents with confusion per ER chart 80 y.o. female with past medical history significant for dementia, HTN, and psychiatric disorder who presented from assisted living facility via EMS with chief complaint of AMS.  Per EMS the patient was found altered by staff at the facility that she lives at this afternoon and was last seen at her baseline yesterday evening no toher history could be obtained and CT scan of head showed - Bilateral chronic subdural hygromas. Superimposed small right acute subdural hematoma versus prominent cortical vein Contrast CT recommended. Neurosurgery evaluated patient and recommended repeating CT head which was done today,showing stable subacute bilateral subdural hematomas. Nurse reports that family visit patient and stated that she was at her baseline. Current Facility-Administered Medications   Medication Dose Route Frequency    sodium chloride (NS) flush 5-10 mL  5-10 mL IntraVENous Q8H    sodium chloride (NS) flush 5-10 mL  5-10 mL IntraVENous PRN        Review of Systems  Confused due to advanced dementia,not providing any informations. Objective:     Visit Vitals    /79    Pulse 63    Temp 98.2 °F (36.8 °C)    Resp 23    Ht 5' 6\" (1.676 m)    Wt 56.7 kg (125 lb 1.6 oz)    SpO2 100%    BMI 20.19 kg/m2      O2 Device: Room air    Temp (24hrs), Av.8 °F (36.6 °C), Min:96.9 °F (36.1 °C), Max:98.4 °F (36.9 °C)      701 - 1900  In: 98.3 [I.V.:98.3]  Out: -   1901 -  0700  In: 1000.8 [I.V.:1000.8]  Out: -   P/E  NAD,lying comfortably in bed. Heent:perrla,at/nc,mouth moist.  Lungs ctab  Heart s1s2 nl,no m/g  Abdm:soft,not tender,bs present. Extr:no edema,good pulses. Neuro:awake,alert and confused,seems at her baseline.   Data Review    No results found for this or any previous visit (from the past 12 hour(s)). Assessment/Plan:     Principal Problem:    SDH (subdural hematoma) ([facility]) (7/19/2017)    Active Problems:    ICH (intracerebral hemorrhage) ([facility] [address] (7/19/2017)    Uncontrolled hypertension on admission    Advanced dementia    Psychiatric disorder    Hypokalemia    SAMMIE    Care Plan   1-Subacute SDHs,chronic:    -Pt stable and seems at her baseline. CT head repeated today without any change.    -Neurosurgery involved and no intervention planned. Recommended hold on AC  2-Possible ICH on CT    -Not seen on repeated CT.  3-Uncontrolled HTN:    -Now controlled and cardene drip d/aleah    -Resume home meds,cozaar 100 mg po daily. 4-Psych disorder    -On multiple psych meds including ativan,trozadone. 5-Advanced dementia    -On aricept. Ativan for agitation prn  6-SAMMIE    -Received iv fluid,will follow lab in am  7-Hypokalemia:    -Repleted.   8-?UTI    -UA not consistent with UTI  DVT prophylaxis:scds  DNR  Disposition:7/21

## 2017-07-20 NOTE — WOUND CARE
Wound Care Note:     New consult placed by nurse for maceration under breasts; patient assessed with Esteban Johansen RN, BSN, CWCN    Chart shows:  Admitted for subdural hematoma; history of dementia, HTN, and psychiatric disorder. Admitted from assisted living facility. Assessment:   Patient is disoriented, communicative, incontinent and assists in repositioning. Bed: Total Care  Patient wearing briefs for incontinence   Patient reports no pain    Bilateral heel, buttocks, and sacral skin intact and without erythema. 1. POA  Bilatera Inframammary skin has rash consistent with yeast along with some scattered hyperkeratotic areas. Spoke with Dr. Eleuterio Ross and orders received for Nystatin powder. She also had a wound on the dorsal aspect of her left hand that was raised, covered in eschar with  blanchable erythema to the hi-wound. No wound care is needed for this wound. Patient repositioned on left side   Heels offloaded on pillow. Recommendations:    Under bilateral breasts- BID cleanse, blot dry and apply Nystatin powder. Skin Care & Pressure Prevention:  Minimize layers of linen/pads under patient to optimize support surface. Turn/reposition approximately every 2 hours and offload heels. Manage incontinence. Aloe Vesta to buttocks and heels.      Discussed above plan with Cyn Toth RN    Transition of Care: Plan to follow as needed while admitted to hospital.    Glenis Mahmood RN, BSN, Wound Care Nurse  office [phone]  pager [phone] or call  to page

## 2017-07-20 NOTE — ROUTINE PROCESS
TRANSFER - OUT REPORT:    Verbal report given to Dave 53 (name) on Bobo Headings  being transferred to [facility]STU(unit) for routine progression of care       Report consisted of patients Situation, Background, Assessment and   Recommendations(SBAR). Information from the following report(s) SBAR, Kardex, ED Summary, Intake/Output, MAR, Recent Results and Cardiac Rhythm SR was reviewed with the receiving nurse. Lines:   Peripheral IV 07/19/17 Left Antecubital (Active)   Site Assessment Clean, dry, & intact 7/20/2017  4:00 PM   Phlebitis Assessment 0 7/20/2017  4:00 PM   Infiltration Assessment 0 7/20/2017  4:00 PM   Dressing Status Clean, dry, & intact 7/20/2017  4:00 PM   Dressing Type Transparent 7/20/2017  4:00 PM   Hub Color/Line Status Green;Capped 7/20/2017  4:00 PM   Action Taken Open ports on tubing capped 7/20/2017  4:00 PM   Alcohol Cap Used Yes 7/20/2017  4:00 PM       Peripheral IV 07/19/17 Left Forearm (Active)   Site Assessment Clean, dry, & intact 7/20/2017  4:00 PM   Phlebitis Assessment 0 7/20/2017  4:00 PM   Infiltration Assessment 0 7/20/2017  4:00 PM   Dressing Status Clean, dry, & intact 7/20/2017  4:00 PM   Dressing Type Transparent 7/20/2017  4:00 PM   Hub Color/Line Status Pink; Infusing 7/20/2017  4:00 PM   Action Taken Open ports on tubing capped 7/20/2017  4:00 PM   Alcohol Cap Used Yes 7/20/2017  4:00 PM        Opportunity for questions and clarification was provided.       Patient transported with:   Monitor  Registered Nurse  Tech

## 2017-07-20 NOTE — PROGRESS NOTES
0730 Bedside and Verbal shift change report given to Maxwell Vick (oncoming nurse) by Tigre Avila (offgoing nurse). Report included the following information SBAR, Kardex, Intake/Output, MAR, Recent Results and Cardiac Rhythm SB/SR.

## 2017-07-20 NOTE — CONSULTS
Palliative Medicine Consult  Phan: [phone]    Patient Name: Miranda Harden  YOB: 1923    Date of Initial Consult: 7/19/17  Reason for Consult: care decisions  Requesting Provider: Maribell Mccormick   Primary Care Physician: Annika Michele MD      SUMMARY:   Miranda Harden is a 80 y.o. with a past history of dementia, htn, psychiatric disorder, who was admitted on 7/19/2017 from NH with a diagnosis of AMS, found to have bilateral chronic subdural collections, superimposed acute right subdural hematoma vs prominent cortical vein. Current medical issues leading to Palliative Medicine involvement include: subdural collections, dementia, debility, goals of care. PALLIATIVE DIAGNOSES:   1. Acute encephalopathy  2. Dementia  3. Debility  4. Goals of care       PLAN:   1. Pt indicates no pain, sob, nausea at this time; continue close assessment  2. Discussed with son Gisella Muller, updates: bp control ok, pt seems to be back to baseline neurologically, no pain reported, pt fairly calm at this time, plan to txfer out of ICU  3. Discussed with Moises about durable DNR, left copy for him to sign on paper chart and discussed with RN  4. Initial consult note routed to primary continuity provider  5.  Communicated plan of care with: Palliative IDT       GOALS OF CARE / TREATMENT PREFERENCES:   [====Goals of Care====]  GOALS OF CARE:  Patient / health care proxy stated goals: DNAR, continue care for acute medical condition, return to prior living arrangement and baseline      TREATMENT PREFERENCES:   Code Status: DNR    Advance Care Planning:  Advance Care Planning 7/20/2017   Patient's Healthcare Decision Maker is: Legal Next of Kin   Primary Decision Maker Name Katheran Landau   Primary Decision Maker Phone Number [phone]   Primary Decision Maker Relationship to Patient Adult child   Confirm Advance Directive None       Other:    The palliative care team has discussed with patient / health care proxy about goals of care / treatment preferences for patient.  [====Goals of Care====]         HISTORY:     Reviewed vitals, I/O's, labs, imaging, MAR, notes. Bradycardic 40s-50s at time; sbp 130's; cardene off since 7/20 0900  CTH 7/20: stable  PO meds about to restart  Got dose ceftriaxone 7/19    HPI/SUBJECTIVE:    The patient is:   [x] Verbal and participatory  [] Non-participatory due to:     Pt states she needs to have bowel movement, relayed to RN. Pt denies pain, sob, nausea. She is confused and perseverates about her nephew. Report is that this is baseline for pt. Clinical Pain Assessment (nonverbal scale for severity on nonverbal patients):   [++++ Clinical Pain Assessment++++]  [++++Pain Severity++++]: Pain: 0  [++++Pain Character++++]:   [++++Pain Duration++++]:   [++++Pain Effect++++]:   [++++Pain Factors++++]:   [++++Pain Frequency++++]:   [++++Pain Location++++]:   [++++ Clinical Pain Assessment++++]     FUNCTIONAL ASSESSMENT:     Palliative Performance Scale (PPS):  PPS: 30       PSYCHOSOCIAL/SPIRITUAL SCREENING:     Advance Care Planning:  Advance Care Planning 7/20/2017   Patient's Healthcare Decision Maker is: Legal Next of Zi Rojas   Primary Decision Maker Name Matty Lazaro   Primary Decision Maker Phone Number [phone]   Primary Decision Maker Relationship to Patient Adult child   Confirm Advance Directive None        Any spiritual / Tenriism concerns:  [] Yes /  [] No    Caregiver Burnout:  [] Yes /  [x] No /  [] No Caregiver Present      Anticipatory grief assessment:   [] Normal  / [] Maladaptive       ESAS Anxiety:      ESAS Depression:       cannot assess due to pt factors for multiple above     REVIEW OF SYSTEMS:     Positive and pertinent negative findings in ROS are noted above in HPI. The following systems were [] reviewed / [x] unable to be reviewed as noted in HPI  Other findings are noted below.   Systems: constitutional, ears/nose/mouth/throat, respiratory, gastrointestinal, genitourinary, musculoskeletal, integumentary, neurologic, psychiatric, endocrine. Positive findings noted below. Modified ESAS Completed by: provider   Fatigue: 5 Drowsiness: 5     Pain: 0     Nausea: 0     Dyspnea: 0                    PHYSICAL EXAM:     From RN flowsheet:  Wt Readings from Last 3 Encounters:   07/20/17 125 lb 1.6 oz (56.7 kg)     Blood pressure 146/74, pulse 66, temperature 98.2 °F (36.8 °C), resp. rate 25, height 5' 6\" (1.676 m), weight 125 lb 1.6 oz (56.7 kg), SpO2 96 %. Pain Scale 1: Adult Nonverbal Pain Scale                    Last bowel movement, if known:     Constitutional: nad, elderly, thin  Eyes: pupils equal, anicteric  ENMT: no nasal discharge, dry mucous membranes  Cardiovascular: regular rhythm, distal pulses intact  Respiratory: breathing not labored, symmetric  Gastrointestinal: soft non-tender, +bowel sounds  Musculoskeletal: no deformity, no tenderness to palpation  Skin: warm, dry  Neurologic: following commands, moving all extremities but generalized weakness noted  Psychiatric: no agitation, cannot assess due to pt factors for remainder  Other:       HISTORY:     Principal Problem:    SDH (subdural hematoma) ([facility]) (7/19/2017)    Active Problems:    ICH (intracerebral hemorrhage) ([facility] [address] (7/19/2017)      Past Medical History:   Diagnosis Date    Dementia 2017    Hypertension     Psychiatric disorder       History reviewed. No pertinent surgical history. History reviewed. No pertinent family history. History reviewed, no pertinent family history.   Social History   Substance Use Topics    Smoking status: Not on file    Smokeless tobacco: Not on file    Alcohol use No     No Known Allergies   Current Facility-Administered Medications   Medication Dose Route Frequency    nystatin (MYCOSTATIN) 100,000 unit/gram powder   Topical BID    [START ON 7/21/2017] atorvastatin (LIPITOR) tablet 10 mg  10 mg Oral DAILY    donepezil (ARICEPT) tablet 10 mg  10 mg Oral QHS    LORazepam (ATIVAN) tablet 0.5 mg  0.5 mg Oral BID    mirtazapine (REMERON) tablet 7.5 mg  7.5 mg Oral QHS    sodium chloride (NS) flush 5-10 mL  5-10 mL IntraVENous Q8H    sodium chloride (NS) flush 5-10 mL  5-10 mL IntraVENous PRN          LAB AND IMAGING FINDINGS:     Lab Results   Component Value Date/Time    WBC 7.3 07/19/2017 01:47 PM    HGB 12.5 07/19/2017 01:47 PM    PLATELET 871 68/84/0005 01:47 PM     Lab Results   Component Value Date/Time    Sodium 139 07/19/2017 01:47 PM    Potassium 3.4 07/19/2017 01:47 PM    Chloride 104 07/19/2017 01:47 PM    CO2 28 07/19/2017 01:47 PM    BUN 24 07/19/2017 01:47 PM    Creatinine 1.19 07/19/2017 01:47 PM    Calcium 8.5 07/19/2017 01:47 PM      Lab Results   Component Value Date/Time    AST (SGOT) 12 07/19/2017 01:47 PM    Alk. phosphatase 90 07/19/2017 01:47 PM    Protein, total 6.6 07/19/2017 01:47 PM    Albumin 3.1 07/19/2017 01:47 PM    Globulin 3.5 07/19/2017 01:47 PM     Lab Results   Component Value Date/Time    INR 1.1 07/19/2017 01:47 PM    Prothrombin time 11.6 07/19/2017 01:47 PM    aPTT 25.4 07/19/2017 01:47 PM      No results found for: IRON, FE, TIBC, IBCT, PSAT, FERR   No results found for: PH, PCO2, PO2  No components found for: GLPOC   No results found for: CPK, CKMB             Total time: 25min  Counseling / coordination time, spent as noted above: 15min  > 50% counseling / coordination?: y    Prolonged service was provided for  []30 min   []75 min in face to face time in the presence of the patient, spent as noted above. Time Start:   Time End:   Note: this can only be billed with 82367 (initial) or 53066 (follow up). If multiple start / stop times, list each separately.

## 2017-07-20 NOTE — PROGRESS NOTES
Attended interdisciplinary rounds in ICU. : Rev. Salina Read.  Prabhu Bhatia; [facility]; to contact [phone] Balwinder Liriano call: [phone]

## 2017-07-20 NOTE — PROGRESS NOTES
Bedside shift change report given to Dash Quintanilla [place] (oncoming nurse) by Andre Merchant RN (offgoing nurse). Report included the following information SBAR, Kardex, Intake/Output, MAR, Accordion, Recent Results and Cardiac Rhythm NSR/SB.        Primary Nurse Billy Willoughby RN and Dash Quintanilla RN performed a dual skin assessment on this patient Impairment noted- (Left hand, pink bilateral heels, pink sacrum, yeast under bilateral breast) wound care following   Bradley score is 13

## 2017-07-20 NOTE — PROGRESS NOTES
Spiritual Care Assessment/Progress Notes    Nirmal Romero [id number]  [id number]    10/7/1923  80 y.o.  female    Patient Telephone Number: [phone] (home)   Voodoo Affiliation:    Language: English   Extended Emergency Contact Information  Primary Emergency Contact: Shaunna Bone Phone: [phone]  Relation: None   Patient Active Problem List    Diagnosis Date Noted    SDH (subdural hematoma) ([facility] 07/19/2017    ICH (intracerebral hemorrhage) ([facility] 07/19/2017        Date: 7/20/2017       Level of Voodoo/Spiritual Activity:  [x]         Involved in turner tradition/spiritual practice    []         Not involved in turner tradition/spiritual practice  []         Spiritually oriented    []         Claims no spiritual orientation    []         seeking spiritual identity  []         Feels alienated from Restorationism practice/tradition  []         Feels angry about Restorationism practice/tradition  []         Spirituality/Restorationism tradition may be a resource for coping at this time.   []         Not able to assess due to medical condition    Services Provided Today:  []         crisis intervention    []         reading Scriptures  [x]         spiritual assessment    []         prayer  [x]         empathic listening/emotional support  []         rites and rituals (cite in comments)  []         life review     []         Restorationism support  []         theological development   []         advocacy  []         ethical dialog     []         blessing  []         bereavement support    []         support to family  []         anticipatory grief support   []         help with AMD  []         spiritual guidance    []         meditation      Spiritual Care Needs  [x]         Emotional Support  []         Spiritual/Voodoo Care  []         Loss/Adjustment  []         Advocacy/Referral                /Ethics  []         No needs expressed at               this time  []         Other: (note in comments)  Spiritual Care Plan  []         Follow up visits with               pt/family  []         Provide materials  []         Schedule sacraments  []         Contact Community               Clergy  [x]         Follow up as needed  []         Other: (note in               comments)     Comments: Pt was resting at first attempt, but was interacting with her nurse when I passed by her room a short time later. Pt shared that she isn't sure if her family knows that she is here. Reassured her that they do (and it appears that family visited yesterday, but pt does not remember). Reassured pt that she is in a place where she will be cared for and that we would continue to speak with her family. Pt expressed appreciation for that reassurance. Explored with pt if she is a part of a turner tradition. She shared that she has been involved in the [employer] in the past.  Pt appears receptive to follow-up  visits as able for emotional support and reassurance. Spoke with  [facility] following visit, who had just spoken with pt's son by phone. Chaplains will continue to follow as able.       Clarissa Dawn, Palliative

## 2017-07-20 NOTE — PROGRESS NOTES
1930: Bedside and Verbal shift change report given to Haroldo Barrera RN (oncoming nurse) by Suzette Marcos RN (offgoing nurse). Report included the following information SBAR, Kardex, Intake/Output, MAR, Accordion, Recent Results and Cardiac Rhythm NSR-ST.     0730: Bedside and Verbal shift change report given to Suzette Marcos RN (oncoming nurse) by Haroldo Barrera RN (offgoing nurse). Report included the following information SBAR, Kardex, Intake/Output, MAR, Accordion, Recent Results and Cardiac Rhythm NSR-ST. Shift Summary  No events overnight, cardene turned back on at 5mg/hr to keep SBP < 160. Family at bedside last night states they feel she is back to her baseline as she was very talkative even though pleasantly confused. Transported to routine CT without issue.

## 2017-07-20 NOTE — CONSULTS
[facility]  INTENSIVIST DAILY PROGRESS NOTE  Name: Jose Faustin   : 10/7/1923   MRN: [id number]   Date: 2017 8:44 AM   I have reviewed the flowsheet and previous days notes. Chart and notes reviewed. Data reviewed. I review the patient's current medications in the medical record at each encounter. I have evaluated and examined the patient. HPI:   80-year old female with history of dementia, psychiatric disorder, and traumatic brain injury. She was admitted yesterday from assisted living facility with altered mental status per staff. She was sent to ED for evaluation where CT scan of the head shows subacute bilateral subdural collections. She was admitted to ICU for further monitoring. She is a poor historian and no family at bedside. Overnight events reviewed:  No events noted                  ROS: Resting comfortably in bed, awake, and pleasantly confused. She is disoriented to place, situation, time. She says she has \"lost her rings, and has offer a nice reward\". She does not offer any specific complaints. She is stable on room, hemodynamically stable. Vital Signs:    Visit Vitals    /57 (BP 1 Location: Left arm, BP Patient Position: At rest)    Pulse 62    Temp 98.2 °F (36.8 °C)    Resp 18    Ht 5' 6\" (1.676 m)    Wt 56.7 kg (125 lb 1.6 oz)    SpO2 95%    BMI 20.19 kg/m2       O2 Device: Room air       Temp (24hrs), Av.8 °F (36.6 °C), Min:96.9 °F (36.1 °C), Max:98.4 °F (36.9 °C)       Intake/Output:   Last shift:      701 - 1900  In: 98.3 [I.V.:98.3]  Out: -   Last 3 shifts: 1901 - 700  In: 1000.8 [I.V.:1000.8]  Out: -     Intake/Output Summary (Last 24 hours) at 17 0844  Last data filed at 17 0800   Gross per 24 hour   Intake          1099.17 ml   Output                0 ml   Net          1099.17 ml       Physical Exam:  General:  Alert, cooperative, no distress, appears stated age.    Head:  Normocephalic, without obvious abnormality, atraumatic. Eyes:  Conjunctivae/corneas clear. PERRL, EOMs intact. Nose: Nares normal. Septum midline. Mucosa normal. No drainage or sinus tenderness. Throat: Lips, mucosa, and tongue normal.   Neck: Supple, symmetrical, trachea midline, no adenopathy, thyroid: no enlargment/tenderness/nodules, no carotid bruit and no JVD. Back:   Symmetric, no curvature. ROM normal.   Lungs:   Clear to auscultation bilaterally. Chest wall:  No tenderness or deformity. Heart:  Regular rate and rhythm, S1, S2 normal, no murmur, click, rub or gallop. Abdomen:   Soft, non-tender. Bowel sounds normal. No masses,  No organomegaly. Extremities: Extremities normal, atraumatic, no cyanosis or edema. Pulses: 2+ and symmetric all extremities. Skin: Skin color, texture, turgor normal. No rashes or lesions   Lymph nodes: Cervical, supraclavicular, and axillary nodes normal.   Neurologic: Grossly nonfocal       DATA:   Current Facility-Administered Medications   Medication Dose Route Frequency    niCARdipine (CARDENE) 25 mg in 0.9% sodium chloride 250 mL infusion  5-15 mg/hr IntraVENous TITRATE    sodium chloride (NS) flush 5-10 mL  5-10 mL IntraVENous Q8H    sodium chloride (NS) flush 5-10 mL  5-10 mL IntraVENous PRN       Telemetry:          Labs:  Recent Labs      07/19/17   1347   WBC  7.3   HGB  12.5   HCT  38.7   PLT  281     Recent Labs      07/19/17   1347   NA  139   K  3.4*   CL  104   CO2  28   GLU  83   BUN  24*   CREA  1.19*   CA  8.5   ALB  3.1*   SGOT  12*   ALT  11*   INR  1.1     No results for input(s): PH, PCO2, PO2, HCO3, FIO2 in the last 72 hours. Imaging:  I have personally reviewed the patients radiographs and reports. 7/20/17: CT of head w/o contrast- Stable subacute bilateral subdural hematomas. 7/19/17: CT of head w/ contrast- Subtle acute versus subacute on chronic right frontal subdural hematoma is confirmed. No pathologically enhancing mass.  Unchanged chronic left subdural hygroma and left craniotomy.      IMPRESSION:   · Altered mental status  · History of TBI s/p craniotomy  · Dementia  · underlying psychiatric disorder  · DNR status  · History of HTN   PLAN:   · o2 per protocol, if needed  · Follow up ECHO  · Neuro checks   · Tight blood pressure control  · From PCCM standpoint can go to the NSTU when cleared by neurology             Mary Costello NP

## 2017-07-20 NOTE — PROGRESS NOTES
TRANSFER - IN REPORT:    Verbal report received from [facility], RN(name) on Radha Tan  being received from ICU(unit) for routine progression of care      Report consisted of patients Situation, Background, Assessment and   Recommendations(SBAR). Information from the following report(s) SBAR, Kardex, Intake/Output, MAR, Accordion and Recent Results was reviewed with the receiving nurse. Opportunity for questions and clarification was provided. Assessment completed upon patients arrival to unit and care assumed.

## 2017-07-21 LAB
ANION GAP BLD CALC-SCNC: 8 MMOL/L (ref 5–15)
BACTERIA SPEC CULT: NORMAL
BUN SERPL-MCNC: 15 MG/DL (ref 6–20)
BUN/CREAT SERPL: 19 (ref 12–20)
CALCIUM SERPL-MCNC: 8.4 MG/DL (ref 8.5–10.1)
CC UR VC: NORMAL
CHLORIDE SERPL-SCNC: 106 MMOL/L (ref 97–108)
CO2 SERPL-SCNC: 25 MMOL/L (ref 21–32)
CREAT SERPL-MCNC: 0.77 MG/DL (ref 0.55–1.02)
GLUCOSE SERPL-MCNC: 79 MG/DL (ref 65–100)
POTASSIUM SERPL-SCNC: 3.3 MMOL/L (ref 3.5–5.1)
SERVICE CMNT-IMP: NORMAL
SODIUM SERPL-SCNC: 139 MMOL/L (ref 136–145)

## 2017-07-21 PROCEDURE — 80048 BASIC METABOLIC PNL TOTAL CA: CPT | Performed by: INTERNAL MEDICINE

## 2017-07-21 PROCEDURE — 74011250637 HC RX REV CODE- 250/637: Performed by: NURSE PRACTITIONER

## 2017-07-21 PROCEDURE — 74011250637 HC RX REV CODE- 250/637: Performed by: INTERNAL MEDICINE

## 2017-07-21 PROCEDURE — 65660000000 HC RM CCU STEPDOWN

## 2017-07-21 PROCEDURE — 36415 COLL VENOUS BLD VENIPUNCTURE: CPT | Performed by: INTERNAL MEDICINE

## 2017-07-21 PROCEDURE — 74011250636 HC RX REV CODE- 250/636: Performed by: INTERNAL MEDICINE

## 2017-07-21 RX ORDER — POTASSIUM CHLORIDE 20MEQ/15ML
40 LIQUID (ML) ORAL
Status: COMPLETED | OUTPATIENT
Start: 2017-07-21 | End: 2017-07-21

## 2017-07-21 RX ORDER — POTASSIUM CHLORIDE 7.45 MG/ML
10 INJECTION INTRAVENOUS ONCE
Status: COMPLETED | OUTPATIENT
Start: 2017-07-21 | End: 2017-07-22

## 2017-07-21 RX ADMIN — POTASSIUM CHLORIDE 10 MEQ: 10 INJECTION, SOLUTION INTRAVENOUS at 17:19

## 2017-07-21 RX ADMIN — POTASSIUM CHLORIDE 40 MEQ: 40 SOLUTION ORAL at 05:50

## 2017-07-21 RX ADMIN — DONEPEZIL HYDROCHLORIDE 10 MG: 10 TABLET, FILM COATED ORAL at 22:50

## 2017-07-21 RX ADMIN — LORAZEPAM 0.5 MG: 0.5 TABLET ORAL at 17:22

## 2017-07-21 RX ADMIN — Medication 10 ML: at 17:18

## 2017-07-21 RX ADMIN — ATORVASTATIN CALCIUM 10 MG: 10 TABLET, FILM COATED ORAL at 08:27

## 2017-07-21 RX ADMIN — NYSTATIN: 100000 POWDER TOPICAL at 09:00

## 2017-07-21 RX ADMIN — Medication 10 ML: at 06:00

## 2017-07-21 RX ADMIN — Medication 10 ML: at 22:51

## 2017-07-21 RX ADMIN — NYSTATIN: 100000 POWDER TOPICAL at 18:00

## 2017-07-21 RX ADMIN — HYDRALAZINE HYDROCHLORIDE 10 MG: 20 INJECTION INTRAMUSCULAR; INTRAVENOUS at 03:28

## 2017-07-21 RX ADMIN — MIRTAZAPINE 7.5 MG: 15 TABLET, FILM COATED ORAL at 22:50

## 2017-07-21 RX ADMIN — LORAZEPAM 0.5 MG: 0.5 TABLET ORAL at 08:27

## 2017-07-21 NOTE — PROGRESS NOTES
Hospitalist Progress Note  Jsesy Reid MD  Internal medicine/ Hospitalist    Daily Progress Note: 7/21/2017 11:46 AM      Interval history / Subjective:   Roseline Schumacher is a 80 y.o. female who presents with confusion per ER chart 80 y.o. female with past medical history significant for dementia, HTN, and psychiatric disorder who presented from assisted living facility via EMS with chief complaint of AMS.  Per EMS the patient was found altered by staff at the facility that she lives at this afternoon and was last seen at her baseline yesterday evening no toher history could be obtained and CT scan of head showed - Bilateral chronic subdural hygromas. Superimposed small right acute subdural hematoma versus prominent cortical vein Contrast CT recommended. Neurosurgery evaluated patient and recommended repeating CT head which was done today,showing stable subacute bilateral subdural hematomas. Nurse reports that family visited patient and stated that she was at her baseline. Palliative care has discussed code status with patient and she is now DNR      Current Facility-Administered Medications   Medication Dose Route Frequency    nystatin (MYCOSTATIN) 100,000 unit/gram powder   Topical BID    atorvastatin (LIPITOR) tablet 10 mg  10 mg Oral DAILY    donepezil (ARICEPT) tablet 10 mg  10 mg Oral QHS    LORazepam (ATIVAN) tablet 0.5 mg  0.5 mg Oral BID    mirtazapine (REMERON) tablet 7.5 mg  7.5 mg Oral QHS    hydrALAZINE (APRESOLINE) 20 mg/mL injection 10 mg  10 mg IntraVENous Q6H PRN    sodium chloride (NS) flush 5-10 mL  5-10 mL IntraVENous Q8H    sodium chloride (NS) flush 5-10 mL  5-10 mL IntraVENous PRN        Review of Systems  Confused due to advanced dementia,not providing any informations. Only said once that she needed her family.     Objective:     Visit Vitals    /70    Pulse 67    Temp 98 °F (36.7 °C)    Resp 17    Ht 5' 6\" (1.676 m)    Wt 58.1 kg (128 lb 1.6 oz)    SpO2 93%    BMI 20.68 kg/m2      O2 Device: Room air    Temp (24hrs), Av.2 °F (36.8 °C), Min:97.9 °F (36.6 °C), Max:99.2 °F (37.3 °C)          1901 -  0700  In: 857.5 [I.V.:857.5]  Out: 800 [Urine:800]  P/E  NAD,lying comfortably in bed. Heent:perrla,at/nc,mouth moist.  Lungs ctab  Heart s1s2 nl,no m/g  Abdm:soft,not tender,bs present. Extr:no edema,good pulses. Neuro:awake,alert and confused,seems at her baseline. Data Review    No results found for this or any previous visit (from the past 12 hour(s)). Assessment/Plan:     Principal Problem:    SDH (subdural hematoma) ([facility]) (2017)    Active Problems:    ICH (intracerebral hemorrhage) ([facility] (2017)    Uncontrolled hypertension on admission    Advanced dementia    Psychiatric disorder    Hypokalemia    SAMMIE    Care Plan   1-Subacute SDHs,chronic:    -Pt stable and seems at her baseline. CT head repeated today without any change.    -Neurosurgery involved and no intervention planned. Recommended hold on [facility]    -PT/OT  2-Possible ICH on CT    -Not seen on repeated CT.  3-Uncontrolled HTN:    -Now controlled and cardene drip d/aleah    -On home meds,cozaar 100 mg po daily. 4-Psych disorder    -On multiple psych meds including ativan,trozadone. 5-Advanced dementia    -On aricept. Ativan for agitation prn  6-SAMMIE    -Received iv fluid. Now resolved  7-Hypokalemia:    -Repleted.   8-?UTI    -UA not consistent with UTI  DVT prophylaxis:scds  DNR  Disposition:

## 2017-07-21 NOTE — PROGRESS NOTES
Spoke with Director of [facility]. ( [phone])  Provided call back number and name to assist with d/c coordination. Faxed clinical updates to [phone]. Facility does not accommodate weekend admissions.         Sade Fan RN

## 2017-07-21 NOTE — ROUTINE PROCESS
Patient sleeping, but arouses to verbal stimuli, patient  Denies pain, but does not want to eat at this time, continue to monitor

## 2017-07-21 NOTE — PROGRESS NOTES
Neurosurgery Progress Note    Wilton Grey, [facility]  [phone]    Admit Date: 2017   LOS: 1 day        Daily Progress Note: 2017      Subjective: The patient is a 79 y/o who has dementia and lives in an assisted living facility with a memory care wing. She had a traumatic brain injury 14 years ago requiring a craniotomy. She had altered mental status from her baseline and was taken to the ER. Her head CT showed bilateral subdural hematomas without significant mass effect. She is neurologically at her baseline. She is very sweet and tells me \"I know who my sons are but I don't know how I got here. I've lost a lot of years in my memory. \"    Denies numbness, tingling, chest pain, leg pain, nausea, vomiting, difficulty swallowing, headache, and dyspnea. Objective:     Vital signs  Temp (24hrs), Av.4 °F (36.9 °C), Min:98 °F (36.7 °C), Max:99.2 °F (37.3 °C)      701 -  1900  In: 1099.2 [I.V.:1099.2]  Out: 800 [Urine:800]    Visit Vitals    /75 (BP 1 Location: Left arm, BP Patient Position: At rest)    Pulse 77    Temp 99.2 °F (37.3 °C)  Comment: TRANSFER FROM ICU    Resp 18    Ht 5' 6\" (1.676 m)    Wt 56.7 kg (125 lb 1.6 oz)    SpO2 97%    BMI 20.19 kg/m2      O2 Device: Room air     Pain control  Pain Assessment  Pain Scale 1: Numeric (0 - 10)  Pain Intensity 1: 0    PT/OT  Gait                 Physical Exam:  Gen:NAD. Neuro: A&Ox1. Follows commands. Speech clear. Affect normal.  PERRL. EOMI. Face symmetric. Palate symmetric. Tongue midline. PEREZ. Strength 5/5 in UE and LE BL. Negative drift. Gait deferred. CT head without contrast on 17 shows stable subacute bilateral subdural hematomas. 24 hour results:    No results found for this or any previous visit (from the past 24 hour(s)).        Assessment:     Principal Problem:    SDH (subdural hematoma) ([facility]) (2017)    Active Problems:    [facility] ([facility] (2017)        Plan: 1. SDH   - Neurologically stable   - Palliative care consult   - CT stable   - Transfer to NSTU  2. Dementia   - Cont home meds   - supportive care  3.  HTN   - hospitalist following    Activity: up with assist  DVT ppx: SCDs  Dispo: tbd    Plan d/w Dr. Glee Schlatter, NP

## 2017-07-21 NOTE — PROGRESS NOTES
Follow up visit attempted with Ms. Ian Craven. Pt was resting and did not respond when I gently spoke her name. Chaplains will continue to follow as able/needed; please page at [phone].     Clarissa Davis, Palliative

## 2017-07-21 NOTE — PROGRESS NOTES
Bedside shift change report given to Emilia James RN (oncoming nurse) by Padma Ortiz RN (offgoing nurse). Report included the following information SBAR, Kardex, Intake/Output, MAR, Accordion, Recent Results and Cardiac Rhythm SB/SA.

## 2017-07-22 PROCEDURE — G8988 SELF CARE GOAL STATUS: HCPCS

## 2017-07-22 PROCEDURE — G8987 SELF CARE CURRENT STATUS: HCPCS

## 2017-07-22 PROCEDURE — 97165 OT EVAL LOW COMPLEX 30 MIN: CPT

## 2017-07-22 PROCEDURE — 97530 THERAPEUTIC ACTIVITIES: CPT

## 2017-07-22 PROCEDURE — 97535 SELF CARE MNGMENT TRAINING: CPT

## 2017-07-22 PROCEDURE — G8978 MOBILITY CURRENT STATUS: HCPCS

## 2017-07-22 PROCEDURE — 65660000000 HC RM CCU STEPDOWN

## 2017-07-22 PROCEDURE — 97161 PT EVAL LOW COMPLEX 20 MIN: CPT

## 2017-07-22 PROCEDURE — 74011250637 HC RX REV CODE- 250/637: Performed by: INTERNAL MEDICINE

## 2017-07-22 PROCEDURE — G8979 MOBILITY GOAL STATUS: HCPCS

## 2017-07-22 RX ADMIN — LORAZEPAM 0.5 MG: 0.5 TABLET ORAL at 18:09

## 2017-07-22 RX ADMIN — NYSTATIN: 100000 POWDER TOPICAL at 11:30

## 2017-07-22 RX ADMIN — DONEPEZIL HYDROCHLORIDE 10 MG: 10 TABLET, FILM COATED ORAL at 22:41

## 2017-07-22 RX ADMIN — ATORVASTATIN CALCIUM 10 MG: 10 TABLET, FILM COATED ORAL at 08:35

## 2017-07-22 RX ADMIN — LORAZEPAM 0.5 MG: 0.5 TABLET ORAL at 08:35

## 2017-07-22 RX ADMIN — MIRTAZAPINE 7.5 MG: 15 TABLET, FILM COATED ORAL at 22:41

## 2017-07-22 RX ADMIN — Medication 10 ML: at 08:35

## 2017-07-22 RX ADMIN — NYSTATIN: 100000 POWDER TOPICAL at 18:08

## 2017-07-22 NOTE — INTERDISCIPLINARY ROUNDS
IDR/SLIDR Summary          Patient: Malik Bennett MRN: [id number]    Age: 80 y.o. YOB: 1923 Room/Bed: [facility]   Admit Diagnosis: ICH (intracerebral hemorrhage) (HCC)  Principal Diagnosis: SDH (subdural hematoma) (HCC)   Goals: discharge today  Readmission: NO  Quality Measure: Not applicable  VTE Prophylaxis: Mechanical  Influenza Vaccine screening completed? NO  Pneumococcal Vaccine screening completed? NO  Mobility needs: Yes   Nutrition plan:Yes  Consults:P.T, O.T. and Case Management    Financial concerns:Yes  Escalated to CM? YES  RRAT Score: 12   Interventions:N/A  Testing due for pt today?  NO  LOS: 7 days Expected length of stay 7 days  Discharge plan: home   PCP: Annika Michele MD  Transportation needs: No    Days before discharge:ready for discharge  Discharge disposition: Home    Signed:     Corina Durán RN  7/22/2017  5:31 AM

## 2017-07-22 NOTE — PROGRESS NOTES
Hospitalist Progress Note  Jessy Reid MD  Internal medicine/ Hospitalist    Daily Progress Note: 7/22/2017 11:46 AM      Interval history / Subjective:   Roseline Schumacher is a 80 y.o. female who presents with confusion per ER chart 80 y.o. female with past medical history significant for dementia, HTN, and psychiatric disorder who presented from assisted living facility via EMS with chief complaint of AMS.  Per EMS the patient was found altered by staff at the facility that she lives at this afternoon and was last seen at her baseline yesterday evening no toher history could be obtained and CT scan of head showed - Bilateral chronic subdural hygromas. Superimposed small right acute subdural hematoma versus prominent cortical vein Contrast CT recommended. Neurosurgery evaluated patient and recommended repeating CT head which was done today,showing stable subacute bilateral subdural hematomas. Nurse reports that family visited patient and stated that she was at her baseline. Palliative care has discussed code status with patient and she is now DNR      Current Facility-Administered Medications   Medication Dose Route Frequency    nystatin (MYCOSTATIN) 100,000 unit/gram powder   Topical BID    atorvastatin (LIPITOR) tablet 10 mg  10 mg Oral DAILY    donepezil (ARICEPT) tablet 10 mg  10 mg Oral QHS    LORazepam (ATIVAN) tablet 0.5 mg  0.5 mg Oral BID    mirtazapine (REMERON) tablet 7.5 mg  7.5 mg Oral QHS    hydrALAZINE (APRESOLINE) 20 mg/mL injection 10 mg  10 mg IntraVENous Q6H PRN    sodium chloride (NS) flush 5-10 mL  5-10 mL IntraVENous PRN        Review of Systems  Confused due to advanced dementia,not providing any informations. Only said once that she is cold.     Objective:     Visit Vitals    /72 (BP 1 Location: Left arm, BP Patient Position: At rest;Lying right side)    Pulse 82    Temp 98.6 °F (37 °C)    Resp 22    Ht 5' 6\" (1.676 m)    Wt 58.1 kg (128 lb 1.6 oz)    SpO2 96%    BMI 20.68 kg/m2      O2 Device: Room air    Temp (24hrs), Av.8 °F (37.1 °C), Min:98.3 °F (36.8 °C), Max:99 °F (37.2 °C)      701 - 1900  In: 480 [P.O.:480]  Out: 300 [Urine:300]  1901 -  07  In: 900 [P.O.:900]  Out: -   P/E  NAD,lying comfortably in bed. Heent:perrla,at/nc,mouth moist.  Lungs ctab  Heart s1s2 nl,no m/g  Abdm:soft,not tender,bs present. Extr:no edema,good pulses. Neuro:awake,alert and confused,seems at her baseline. Data Review    No results found for this or any previous visit (from the past 12 hour(s)). Assessment/Plan:     Principal Problem:    SDH (subdural hematoma) ([facility]) (2017)    Active Problems:    ICH (intracerebral hemorrhage) ([facility] (2017)    Uncontrolled hypertension on admission    Advanced dementia    Psychiatric disorder    Hypokalemia    SAMMIE    Care Plan   1-Subacute SDHs,chronic:    -Pt stable and seems at her baseline. CT head repeated today without any change.    -Neurosurgery involved and no intervention planned. Recommended hold on [facility]    -PT/OT  2-Possible ICH on CT    -Not seen on repeated CT.  3-Uncontrolled HTN:    -Now controlled and cardene drip d/aleah    -On home meds,cozaar 100 mg po daily. 4-Psych disorder    -On multiple psych meds including ativan,trozadone. 5-Advanced dementia    -On aricept. Ativan for agitation prn  6-SAMMIE    -Received iv fluid. Now resolved  7-Hypokalemia:    -Repleted.   8-?UTI    -UA not consistent with UTI  DVT prophylaxis:scds  DNR  Disposition:waiting for return to SNF,likely

## 2017-07-22 NOTE — PROGRESS NOTES
Bedside shift change report received from Domingo Mcelroy RN by Eulalia Moss [address] [place] [address] nurse).  Report included SBAR, Kardex, Intake/Output, MAR, Recent Results and Cardiac Rhythm SB.

## 2017-07-22 NOTE — PROGRESS NOTES
Problem: Self Care Deficits Care Plan (Adult)  Goal: *Acute Goals and Plan of Care (Insert Text)  Occupational Therapy Goals  Initiated 7/22/2017  1. Patient will perform tolerate standing X 5 mins with minimal assistance within 7 day(s). 2. Patient will perform lower body dressing with supervision/set-up within 7 day(s). 3. Patient will perform grooming sitting EOB with supervision/set-up within 7 day(s). 4. Patient will perform toilet transfers with supervision/set-up within 7 day(s). 5. Patient will perform all aspects of toileting with supervision/set-up within 7 day(s). 6. Patient will participate in upper extremity therapeutic exercise/activities with supervision/set-up within 7 day(s). OCCUPATIONAL THERAPY EVALUATION  Patient: Lakeisha Melissa (58 y.o. female)  Date: 7/22/2017  Primary Diagnosis: ICH (intracerebral hemorrhage) ([facility])        Precautions:   Bed Alarm, Fall      ASSESSMENT :  Based on the objective data described below, the patient presents with confusion (although likely baseline given hx of dementia and TBI), decreased functional reach to feet (also may be baseline; chart reports occasional assist for ADLs), decreased sitting balance, decreased standing tolerance/balance and generalized weakness. Unclear of baseline as pt is unable to provide any history (chart indicates she is ambulatory with RW and [facility] in [facility]( so anticipate pt is below her functional baseline. Pt currently requires MIN A for functional transfers, MIN A for balance sitting EOB, and maximal to total assist for ADLs. Pt left seated in the chair with alarm on and lunch tray present. [facility]. Recommend with nursing patient to complete as able in order to maintain strength, endurance and independence: OOB to chair 3x/day with assist X 1, ADLs with supervision/setup and mobilizing to the [facility] for toileting with assist X 1.  Thank you for your assistance. Patient will benefit from skilled intervention to address the above impairments. Patients rehabilitation potential is considered to be Good  Factors which may influence rehabilitation potential include:   [ ]             None noted  [X]             Mental ability/status  [ ]             Medical condition  [ ]             Home/family situation and support systems  [ ]             Safety awareness  [ ]             Pain tolerance/management  [ ]             Other:        PLAN :  Recommendations and Planned Interventions:  [X]               Self Care Training                  [X]        Therapeutic Activities  [X]               Functional Mobility Training    [ ]        Cognitive Retraining  [X]               Therapeutic Exercises           [X]        Endurance Activities  [X]               Balance Training                   [ ]        Neuromuscular Re-Education  [ ]               Visual/Perceptual Training     [X]   Home Safety Training  [X]               Patient Education                 [X]        Family Training/Education  [ ]               Other (comment):     Frequency/Duration: Patient will be followed by occupational therapy 4 times a week to address goals. Discharge Recommendations: Home Health in familiar environment with increased care vs [facility]  Further Equipment Recommendations for Discharge: TBD       SUBJECTIVE:   Patient stated I'm so cold and so hungry.       OBJECTIVE DATA SUMMARY:   HISTORY:   Past Medical History:   Diagnosis Date    Dementia 2017    Hypertension      Psychiatric disorder     History reviewed. No pertinent surgical history. Prior Level of Function/Home Situation: Pt unable to provide history and admits to not remembering her PLOF.   Chart indicates she lives in snf/memory care unit, ambulates with RW and requires occasional assist with ADLs  Expanded or extensive additional review of patient history: significant for dementia and TBI Home Situation  Home Environment: Assisted living  One/Two Story Residence: One story  Living Alone: No  Support Systems: Assisted living  Patient Expects to be Discharged to[de-identified] Assisted living  Current DME Used/Available at Home: None  [X]  Right hand dominant             [ ]  Left hand dominant     EXAMINATION OF PERFORMANCE DEFICITS:  Cognitive/Behavioral Status:  Neurologic State: Confused  Orientation Level: Disoriented X4  Cognition: Decreased attention/concentration; Follows commands;Memory loss  Perception: Appears intact  Perseveration: Perseverates during conversation  Safety/Judgement: Fall prevention     Skin: intact     Edema: None noted     Hearing: Auditory  Auditory Impairment: Hard of hearing, bilateral     Vision/Perceptual:         Range of Motion:  AROM: Generally decreased, functional        Strength:     Strength: Generally decreased, functional        Coordination:  Coordination: Generally decreased, functional  Fine Motor Skills-Upper: Left Intact; Right Intact    Gross Motor Skills-Upper: Left Intact; Right Intact     Tone & Sensation:  Tone: Normal  Sensation: Intact        Balance:  Sitting: Impaired  Sitting - Static: Fair (occasional)  Sitting - Dynamic: Fair (occasional)  Standing: Impaired  Standing - Static: Fair (with bilateral UE support)  Standing - Dynamic : Poor     Functional Mobility and Transfers for ADLs:  Bed Mobility:  Rolling: Minimum assistance  Supine to Sit: Minimum assistance  Sit to Supine: Assist x1; Moderate assistance (assist of both legs onto the bed)  Scooting: Supervision     Transfers:  Sit to Stand: Minimum assistance  Stand to Sit: Assist x1;Minimum assistance  Bed to Chair: Minimum assistance (bilateral UE support)  Toilet Transfer : Minimum assistance (BSC only)     ADL Assessment:  Feeding: Setup     Oral Facial Hygiene/Grooming: Setup     Bathing:  Moderate assistance     Upper Body Dressing: Minimum assistance     Lower Body Dressing: Maximum assistance     Toileting: Total assistance                 ADL Intervention and task modifications:     Lower Body Dressing Assistance  Socks: Maximum assistance (LOB when attempting task seated EOB)  Leg Crossed Method Used: No  Position Performed: Seated edge of bed     Toileting  Toileting Assistance:  (+ purewick)     Cognitive Retraining  Safety/Judgement: Fall prevention     Functional Measure:  Barthel Index:      Bathin  Bladder: 0  Bowels: 5  Groomin  Dressin  Feedin  Mobility: 0  Stairs: 0  Toilet Use: 5  Transfer (Bed to Chair and Back): 10  Total: 30         Barthel and G-code impairment scale:  Percentage of impairment CH  0% CI  1-19% CJ  20-39% CK  40-59% CL  60-79% CM  80-99% CN  100%   Barthel Score 0-100 100 99-80 79-60 59-40 20-39 1-19    0   Barthel Score 0-20 20 17-19 13-16 9-12 5-8 1-4 0      The Barthel ADL Index: Guidelines  1. The index should be used as a record of what a patient does, not as a record of what a patient could do. 2. The main aim is to establish degree of independence from any help, physical or verbal, however minor and for whatever reason. 3. The need for supervision renders the patient not independent. 4. A patient's performance should be established using the best available evidence. Asking the patient, friends/relatives and nurses are the usual sources, but direct observation and common sense are also important. However direct testing is not needed. 5. Usually the patient's performance over the preceding 24-48 hours is important, but occasionally longer periods will be relevant. 6. Middle categories imply that the patient supplies over 50 per cent of the effort. 7. Use of aids to be independent is allowed. Rahat Merchant., Barthel, D.W. ([id number]). Functional evaluation: the Barthel Index. [address] [facility] (14)2. Cash Archibald kady KAYLAH Alonso, Neli Subramanian., Lauren Morgan., Doris Cordero, [address] [place] [address] ().  Measuring the change indisability after inpatient rehabilitation; comparison of the responsiveness of the Barthel Index and Functional [place] Measure. Journal of Neurology, Neurosurgery, and Psychiatry, 66(4), 292-489. YUNI Henley.HARVEY, NATANAEL Bautista, & Pavithra Betts M.A. (2004.) Assessment of post-stroke quality of life in cost-effectiveness studies: The usefulness of the Barthel Index and the EuroQoL-5D. Quality of Life Research, 13, 222-90         G codes: In compliance with CMSs Claims Based Outcome Reporting, the following G-code set was chosen for this patient based on their primary functional limitation being treated: The outcome measure chosen to determine the severity of the functional limitation was the Barthel Index with a score of 30/100 which was correlated with the impairment scale. · Self Care:               - CURRENT STATUS:    CL - 60%-79% impaired, limited or restricted               - GOAL STATUS:           CJ - 20%-39% impaired, limited or restricted               - D/C STATUS:                       ---------------To be determined---------------      Occupational Therapy Evaluation Charge Determination   History Examination Decision-Making   LOW Complexity : Brief history review  LOW Complexity : 1-3 performance deficits relating to physical, cognitive , or psychosocial skils that result in activity limitations and / or participation restrictions  MEDIUM Complexity : Patient may present with comorbidities that affect occupational performnce.  Miniml to moderate modification of tasks or assistance (eg, physical or verbal ) with assesment(s) is necessary to enable patient to complete evaluation       Based on the above components, the patient evaluation is determined to be of the following complexity level: LOW   Pain:  Pain Scale 1: PAINAD (Advanced Dementia)  Pain Intensity 1: 0              Activity Tolerance:   No s/s of distress; VSS  Vitals:     07/22/17 1151 07/22/17 1156 07/22/17 1201   BP: 154/75 158/85 154/85 BP 1 Location:         BP Patient Position: Pre-activity;Supine Sitting Post activity; Sitting   Pulse: 70 84 86      Please refer to the flowsheet for vital signs taken during this treatment. After treatment:   [X] Patient left in no apparent distress sitting up in chair  [ ] Patient left in no apparent distress in bed  [X] Call bell left within reach  [X] Nursing notified  [ ] Caregiver present  [X] Chair alarm activated      COMMUNICATION/EDUCATION:   The patients plan of care was discussed with: Physical Therapist and Registered Nurse. [ ] Home safety education was provided and the patient/caregiver indicated understanding. [ ] Patient/family have participated as able in goal setting and plan of care. [ ] Patient/family agree to work toward stated goals and plan of care. [ ] Patient understands intent and goals of therapy, but is neutral about his/her participation. [X] Patient is unable to participate in goal setting and plan of care. This patients plan of care is appropriate for delegation to [facility] [place].      Thank you for this referral.  Ester Kahn OT  Time Calculation: 24 mins

## 2017-07-22 NOTE — PROGRESS NOTES
Problem: Mobility Impaired (Adult and Pediatric)  Goal: *Acute Goals and Plan of Care (Insert Text)  Physical Therapy Goals  Initiated 7/22/2017  1. Patient will move from supine to sit and sit to supine , scoot up and down and roll side to side in bed with modified independence within 7 day(s). 2. Patient will transfer from bed to chair and chair to bed with minimal assistance/contact guard assist using the least restrictive device within 7 day(s). 3. Patient will perform sit to stand with supervision/set-up within 7 day(s). 4. Patient will ambulate with minimal assistance/contact guard assist for 50 feet with the least restrictive device within 7 day(s). PHYSICAL THERAPY EVALUATION  Patient: Treva Slater (18 y.o. female)  Date: 7/22/2017  Primary Diagnosis: ICH (intracerebral hemorrhage) ([facility])        Precautions: falls, dementia         ASSESSMENT :  Based on the objective data described below, the patient presents with generalized weakness, confusion (plesantly confused and disoriented), oriented to name only, decreased functional mobility, decreased gait. Pt is a poor historian, so unclear of her ambulatory status PTA. Pt able to assist with mobility to edge of bed, requiring min assist overall. Pt stand with mod assist of 1 and was able to take a few shuffling steps up to head of bed. Further ambulation discouraged due to increased HR (100), with her baseline of 59. Pt assisted back to bed and set up with tray as she stated she was still hungry and wanted to eat her fruit cup. Pt likely close to baseline but will follow up for a few sessions to try and improve gait to more functional short distance. Recommend pt to have 24/7 care following discharge, with possible HHPT if she does not attain her prior level of function. Pt stating she was having mild back pain, did not rate. Patient will benefit from skilled intervention to address the above impairments.   Patients rehabilitation potential is considered to be Good  Factors which may influence rehabilitation potential include:   [ ]         None noted  [X]         Mental ability/status  [ ]         Medical condition  [ ]         Home/family situation and support systems  [ ]         Safety awareness  [ ]         Pain tolerance/management  [ ]         Other:        PLAN :  Recommendations and Planned Interventions:  [X]           Bed Mobility Training             [ ]    Neuromuscular Re-Education  [X]           Transfer Training                   [ ]    Orthotic/Prosthetic Training  [X]           Gait Training                         [ ]    Modalities  [X]           Therapeutic Exercises           [ ]    Edema Management/Control  [ ]           Therapeutic Activities            [X]    Patient and Family Training/Education  [ ]           Other (comment):     Frequency/Duration: Patient will be followed by physical therapy  3 times a week to address goals. Discharge Recommendations: Home Health and None but home with 24/7 care  Further Equipment Recommendations for Discharge: tbd       SUBJECTIVE:   Patient stated I think its cold outside because I am freezing in here.   Room was indeed cold, but pt reoriented to month/season      OBJECTIVE DATA SUMMARY:   HISTORY:    Past Medical History:   Diagnosis Date    Dementia 2017    Hypertension      Psychiatric disorder     History reviewed. No pertinent surgical history.   Prior Level of Function/Home Situation:   Personal factors and/or comorbidities impacting plan of care:      Home Situation  Home Environment: Assisted living  One/Two Story Residence: One story  Living Alone: No  Support Systems: Assisted living  Patient Expects to be Discharged to[de-identified] Assisted living  Current DME Used/Available at Home: None     EXAMINATION/PRESENTATION/DECISION MAKING:   Critical Behavior:  Neurologic State: Confused, Drowsy  Orientation Level: Disoriented to time, Disoriented to situation, Disoriented to place, Disoriented to person  Cognition: Memory loss, Poor safety awareness, Follows commands, Impaired decision making, Decreased attention/concentration     Hearing: Auditory  Auditory Impairment: Hard of hearing, bilateral  Skin:  fragile  Edema: none noted  Range Of Motion:   decreased overall, decreased in RUE shoulder flexion pushpa with pain at end range      Strength:     grossly 3+/5         Tone & Sensation:       pt not reporting N/T          Coordination:   decreased, likely baseline  Vision:    unclear, pt not reporting  Functional Mobility:  Bed Mobility:  Rolling: Minimum assistance  Supine to Sit: Assist x1;Minimum assistance  Sit to Supine: Assist x1; Moderate assistance (assist of both legs onto the bed)  Scooting: Assist x1;Additional time;Minimum assistance  Transfers:  Sit to Stand: Assist x1; Moderate assistance  Stand to Sit: Assist x1;Minimum assistance            Balance:   Sitting: Impaired  Sitting - Static: Fair (occasional)  Sitting - Dynamic: Fair (occasional)  Standing: Impaired  Standing - Static: Poor  Standing - Dynamic : Poor  Ambulation/Gait Training:  Distance (ft): 4 Feet (ft)  Assistive Device: Gait belt  Ambulation - Level of Assistance: Assist x1; Moderate assistance        Gait Abnormalities: Decreased step clearance;Shuffling gait (poor weight shifting)        Base of Support: Narrowed     Speed/Chante: Slow;Shuffled           Functional Measure:  [place] Balance Test:      Sitting to Standin  Standing Unsupported: 0  Sitting with Back Unsupported: 1  Standing to Sittin  Transfers: 1  Standing Unsupported with Eyes Closed: 0  Standing Unsupported with Feet Together: 0  Reach Forward with Outstretched Arm: 0   Object: 0  Turn to Look Over Shoulders: 0  Turn 360 Degrees: 0  Alternate Foot on Step/Stool: 0  Standing Unsupported One Foot in Front: 0  Stand on One Le  Total: 2             56=Maximum possible score;   0-20=High fall risk  21-40=Moderate fall risk 41-56=Low fall risk      [place] Balance Test and G-code impairment scale:  Percentage of Impairment CH     0%    CI     1-19% CJ     20-39% CK     40-59% CL     60-79% CM     80-99% CN      100%   [place]   Score 0-56 56 45-55 34-44 23-33 12-22 1-11 0            G codes: In compliance with CMSs Claims Based Outcome Reporting, the following G-code set was chosen for this patient based on their primary functional limitation being treated: The outcome measure chosen to determine the severity of the functional limitation was the [place] ith a score of 2/56 which was correlated with the impairment scale. · Mobility - Walking and Moving Around:               - CURRENT STATUS:    CM - 80%-99% impaired, limited or restricted               - GOAL STATUS:           CK - 40%-59% impaired, limited or restricted               - D/C STATUS:                       ---------------To be determined---------------      Physical Therapy Evaluation Charge Determination   History Examination Presentation Decision-Making   MEDIUM  Complexity : 1-2 comorbidities / personal factors will impact the outcome/ POC  LOW Complexity : 1-2 Standardized tests and measures addressing body structure, function, activity limitation and / or participation in recreation  LOW Complexity : Stable, uncomplicated  LOW Complexity : FOTO score of       Based on the above components, the patient evaluation is determined to be of the following complexity level: LOW      Pain:  Pain Scale 1: PAINAD (Advanced Dementia)         Activity Tolerance:   fair  Please refer to the flowsheet for vital signs taken during this treatment.   After treatment:   [ ]         Patient left in no apparent distress sitting up in chair  [X]         Patient left in no apparent distress in bed  [X]         Call bell left within reach  [X]         Nursing notified  [ ]         Caregiver present  [X]         Bed alarm activated      COMMUNICATION/EDUCATION:   The patients plan of care was discussed with: Occupational Therapist and Registered Nurse. [ ]         Fall prevention education was provided and the patient/caregiver indicated understanding. [ ]         Patient/family have participated as able in goal setting and plan of care. [ ]         Patient/family agree to work toward stated goals and plan of care. [ ]         Patient understands intent and goals of therapy, but is neutral about his/her participation. [X]         Patient is unable to participate in goal setting and plan of care due to advanced dementia.      Thank you for this referral.  Stacia Lefort, PT   Time Calculation: 21 mins

## 2017-07-23 LAB
ANION GAP BLD CALC-SCNC: 6 MMOL/L (ref 5–15)
BUN SERPL-MCNC: 21 MG/DL (ref 6–20)
BUN/CREAT SERPL: 23 (ref 12–20)
CALCIUM SERPL-MCNC: 8.3 MG/DL (ref 8.5–10.1)
CHLORIDE SERPL-SCNC: 106 MMOL/L (ref 97–108)
CO2 SERPL-SCNC: 26 MMOL/L (ref 21–32)
CREAT SERPL-MCNC: 0.9 MG/DL (ref 0.55–1.02)
GLUCOSE SERPL-MCNC: 89 MG/DL (ref 65–100)
POTASSIUM SERPL-SCNC: 4.5 MMOL/L (ref 3.5–5.1)
SODIUM SERPL-SCNC: 138 MMOL/L (ref 136–145)

## 2017-07-23 PROCEDURE — 74011250636 HC RX REV CODE- 250/636: Performed by: INTERNAL MEDICINE

## 2017-07-23 PROCEDURE — 36415 COLL VENOUS BLD VENIPUNCTURE: CPT | Performed by: INTERNAL MEDICINE

## 2017-07-23 PROCEDURE — 80048 BASIC METABOLIC PNL TOTAL CA: CPT | Performed by: INTERNAL MEDICINE

## 2017-07-23 PROCEDURE — 74011250637 HC RX REV CODE- 250/637: Performed by: INTERNAL MEDICINE

## 2017-07-23 PROCEDURE — 65660000000 HC RM CCU STEPDOWN

## 2017-07-23 RX ORDER — LOSARTAN POTASSIUM 50 MG/1
100 TABLET ORAL DAILY
Status: DISCONTINUED | OUTPATIENT
Start: 2017-07-24 | End: 2017-07-25 | Stop reason: HOSPADM

## 2017-07-23 RX ADMIN — NYSTATIN: 100000 POWDER TOPICAL at 18:14

## 2017-07-23 RX ADMIN — DONEPEZIL HYDROCHLORIDE 10 MG: 10 TABLET, FILM COATED ORAL at 21:25

## 2017-07-23 RX ADMIN — LORAZEPAM 0.5 MG: 0.5 TABLET ORAL at 21:24

## 2017-07-23 RX ADMIN — NYSTATIN: 100000 POWDER TOPICAL at 08:29

## 2017-07-23 RX ADMIN — HYDRALAZINE HYDROCHLORIDE 10 MG: 20 INJECTION INTRAMUSCULAR; INTRAVENOUS at 07:01

## 2017-07-23 RX ADMIN — ATORVASTATIN CALCIUM 10 MG: 10 TABLET, FILM COATED ORAL at 08:29

## 2017-07-23 RX ADMIN — MIRTAZAPINE 7.5 MG: 15 TABLET, FILM COATED ORAL at 21:25

## 2017-07-23 NOTE — PROGRESS NOTES
0730: Bedside and Verbal shift change report given to Paddy Terrazas RN (oncoming nurse) by Stephany Dykes RN (offgoing nurse). Report included the following information SBAR, Kardex, Intake/Output, MAR, Recent Results, Med Rec Status and Cardiac Rhythm NSR.   1930: Bedside and Verbal shift change report given to STEVEN Franco (oncoming nurse) by Paddy Terrazas RN (offgoing nurse). Report included the following information SBAR, Kardex, Intake/Output, MAR, Recent Results, Med Rec Status and Cardiac Rhythm NSR. Problem: Hemorrhagic Stroke: Day 5 through Discharge  Goal: *Hemodynamically stable  Outcome: Progressing Towards Goal  Pt's VSS. Pt's BP elevated in the 778D-794I systolically. PRN Hydralazine given this AM. Will continue to monitor. Goal: *Verbalizes anxiety and depression are reduced or absent  Outcome: Progressing Towards Goal  Pt has no s/sx or complaints of anxiety at this time. AM Ativan held d/t patient's drowsiness. Goal: *Absence of aspiration  Outcome: Progressing Towards Goal  Pt swallows appropriately with no issues noted while eating breakfast. No s/sx of aspiration noted. Goal: *Absence of signs and symptoms of DVT  Outcome: Progressing Towards Goal  Pt has no s/sx of DVT noted. Goal: *Optimal pain control at patients stated goal  Outcome: Progressing Towards Goal  Pt has no complaints or s/sx of pain at this time. Problem: Falls - Risk of  Goal: *Absence of falls  Outcome: Progressing Towards Goal  Pt's bed in low/locked position. All personal items and call bell within reach. Side rails up x 3. Bathroom light on at all times. Problem: Pressure Injury - Risk of  Goal: *Prevention of pressure ulcer  Outcome: Progressing Towards Goal  Pt has no s/sx of pressure ulcer noted. Pt will be turned and repositioned Q2H.

## 2017-07-23 NOTE — PROGRESS NOTES
Hospitalist Progress Note  Johnnie Salamanca MD  Office: [phone]  Cell: [phone]      Date of Service:  2017  NAME:  Miranda Harden  :  10/7/1923  MRN:  [id number]      Admission Summary:   80 y. o. female who presents with confusion per ER chart 80 y.o. female with past medical history significant for dementia, HTN, and psychiatric disorder who presented from assisted living facility via EMS with chief complaint of AMS.  Per EMS the patient was found altered by staff at the facility that she lives at this afternoon and was last seen at her baseline yesterday evening no toher history could be obtained and CT scan of head showed - Bilateral chronic subdural hygromas. Superimposed small right acute subdural hematoma versus prominent cortical vein Contrast CT recommended. Neurosurgery evaluated patient and recommended repeating CT head which was done today,showing stable subacute bilateral subdural hematomas. Nurse reports that family visited patient and stated that she was at her baseline. Palliative care has discussed code status with patient and she is now DNR    Interval history / Subjective:     No events reported     Assessment & Plan:     1-Subacute SDHs,chronic:    -Pt stable and seems at her baseline. CT head repeated today without any change.    -Neurosurgery involved and no intervention planned. Recommended hold on AC    -PT/OT  -Possible ICH on CT    -Not seen on repeated CT.  2-Uncontrolled HTN:    -Now controlled and cardene drip d/aleah    -On home meds,cozaar 100 mg po daily. 3-Psych disorder    -On multiple psych meds including ativan, trozadone. 4-Advanced dementia    -On aricept. Ativan for agitation prn  5-SAMMIE    -Received iv fluid. Now resolved  6-Hypokalemia:    -Repleted.     Code status: DNR  DVT prophylaxis: SCD    Care Plan discussed with: Patient/Family and Nurse  Disposition: TBD Rehab/SNF     Hospital Problems  Never Reviewed Codes Class Noted POA    * (Principal)SDH (subdural hematoma) (HCC) ICD-10-CM: I62.00  ICD-9-CM: 432.1  7/19/2017 Unknown        ICH (intracerebral hemorrhage) ([facility] ICD-10-CM: I61.9  ICD-9-CM: 596  7/19/2017 Unknown                Review of Systems:   Review of systems not obtained due to patient factors. Vital Signs:    Last 24hrs VS reviewed since prior progress note. Most recent are:  Visit Vitals    /60 (BP 1 Location: Right arm, BP Patient Position: At rest)    Pulse 89    Temp 98.2 °F (36.8 °C)    Resp 18    Ht 5' 6\" (1.676 m)    Wt 61 kg (134 lb 7.7 oz)    SpO2 95%    BMI 21.71 kg/m2         Intake/Output Summary (Last 24 hours) at 07/23/17 1607  Last data filed at 07/23/17 1514   Gross per 24 hour   Intake              240 ml   Output              650 ml   Net             -410 ml        Physical Examination:             Constitutional:  No acute distress,    ENT:  Oral mucous moist, oropharynx benign. Neck supple,    Resp:  CTA bilaterally. No wheezing/rhonchi/rales. No accessory muscle use   CV:  Regular rhythm, normal rate, no murmurs, gallops, rubs    GI:  Soft, non distended, non tender. normoactive bowel sounds, no hepatosplenomegaly     Musculoskeletal:  No edema, warm, 2+ pulses throughout    Neurologic:  Moves all extremities. Data Review:    Review and/or order of clinical lab test      Labs:   No results for input(s): WBC, HGB, HCT, PLT, HGBEXT, HCTEXT, PLTEXT in the last 72 hours. Recent Labs      07/23/17   0232  07/21/17   0319   NA  138  139   K  4.5  3.3*   CL  106  106   CO2  26  25   BUN  21*  15   CREA  0.90  0.77   GLU  89  79   CA  8.3*  8.4*     No results for input(s): SGOT, GPT, ALT, AP, TBIL, TBILI, TP, ALB, GLOB, GGT, AML, LPSE in the last 72 hours. No lab exists for component: AMYP, HLPSE  No results for input(s): INR, PTP, APTT in the last 72 hours.     No lab exists for component: INREXT   No results for input(s): FE, TIBC, PSAT, FERR in the last 72 hours. No results found for: FOL, RBCF   No results for input(s): PH, PCO2, PO2 in the last 72 hours. No results for input(s): CPK, CKNDX, TROIQ in the last 72 hours.     No lab exists for component: CPKMB  No results found for: CHOL, CHOLX, CHLST, CHOLV, HDL, LDL, LDLC, DLDLP, TGLX, TRIGL, TRIGP, CHHD, CHHDX  No results found for: [facility]  Lab Results   Component Value Date/Time    Color YELLOW/STRAW 07/19/2017 01:47 PM    Appearance CLEAR 07/19/2017 01:47 PM    Specific gravity 1.016 07/19/2017 01:47 PM    pH (UA) 5.5 07/19/2017 01:47 PM    Protein NEGATIVE  07/19/2017 01:47 PM    Glucose NEGATIVE  07/19/2017 01:47 PM    Ketone NEGATIVE  07/19/2017 01:47 PM    Bilirubin NEGATIVE  07/19/2017 01:47 PM    Urobilinogen 0.2 07/19/2017 01:47 PM    Nitrites NEGATIVE  07/19/2017 01:47 PM    Leukocyte Esterase SMALL 07/19/2017 01:47 PM    Epithelial cells FEW 07/19/2017 01:47 PM    Bacteria NEGATIVE  07/19/2017 01:47 PM    WBC 5-10 07/19/2017 01:47 PM    RBC 5-10 07/19/2017 01:47 PM         Medications Reviewed:     Current Facility-Administered Medications   Medication Dose Route Frequency    nystatin (MYCOSTATIN) 100,000 unit/gram powder   Topical BID    atorvastatin (LIPITOR) tablet 10 mg  10 mg Oral DAILY    donepezil (ARICEPT) tablet 10 mg  10 mg Oral QHS    LORazepam (ATIVAN) tablet 0.5 mg  0.5 mg Oral BID    mirtazapine (REMERON) tablet 7.5 mg  7.5 mg Oral QHS    hydrALAZINE (APRESOLINE) 20 mg/mL injection 10 mg  10 mg IntraVENous Q6H PRN    sodium chloride (NS) flush 5-10 mL  5-10 mL IntraVENous PRN     ______________________________________________________________________  EXPECTED LENGTH OF STAY: 4d 12h  ACTUAL LENGTH OF STAY:          4                 Janeth Ramos MD

## 2017-07-23 NOTE — PROGRESS NOTES
0800 Bedside and Verbal shift change report given to Catrina Brown (oncoming nurse) by Rufina Crane (offgoing nurse). Report included the following information SBAR, MAR and Recent Results.

## 2017-07-23 NOTE — INTERDISCIPLINARY ROUNDS
IDR/SLIDR Summary          Patient: Reese Moon MRN: [id number]    Age: 80 y.o. YOB: 1923 Room/Bed: [facility]   Admit Diagnosis: ICH (intracerebral hemorrhage) (HCC)  Principal Diagnosis: SDH (subdural hematoma) (HCC)   Goals: Discharge  Readmission: NO  Quality Measure: Not applicable  VTE Prophylaxis: Mechanical  Influenza Vaccine screening completed? YES  Pneumococcal Vaccine screening completed? YES  Mobility needs: Yes   Nutrition plan:Yes  Consults:P.T, O.T., Speech and Case Management    Financial concerns:No  Escalated to CM? NO  RRAT Score: 9   Interventions:Palliative Care   Testing due for pt today?  NO  LOS: 3 days Expected length of stay 3 days  Discharge plan: Nursing Home   PCP: Annika Michele MD  Transportation needs: No    Days before discharge:one day until discharge   Discharge disposition: Nursing Home    Signed:     Cortney Treviño RN  7/22/2017  11:10 PM

## 2017-07-24 PROCEDURE — 97535 SELF CARE MNGMENT TRAINING: CPT

## 2017-07-24 PROCEDURE — 74011250637 HC RX REV CODE- 250/637: Performed by: INTERNAL MEDICINE

## 2017-07-24 PROCEDURE — 65660000000 HC RM CCU STEPDOWN

## 2017-07-24 RX ORDER — LORAZEPAM 0.5 MG/1
0.5 TABLET ORAL
Qty: 10 TAB | Refills: 0 | Status: SHIPPED | OUTPATIENT
Start: 2017-07-24 | End: 2017-07-29

## 2017-07-24 RX ORDER — ACETAMINOPHEN 325 MG/1
325 TABLET ORAL
Status: DISCONTINUED | OUTPATIENT
Start: 2017-07-24 | End: 2017-07-25 | Stop reason: HOSPADM

## 2017-07-24 RX ADMIN — LORAZEPAM 0.5 MG: 0.5 TABLET ORAL at 09:25

## 2017-07-24 RX ADMIN — MIRTAZAPINE 7.5 MG: 15 TABLET, FILM COATED ORAL at 21:19

## 2017-07-24 RX ADMIN — LORAZEPAM 0.5 MG: 0.5 TABLET ORAL at 17:34

## 2017-07-24 RX ADMIN — ACETAMINOPHEN 325 MG: 325 TABLET, FILM COATED ORAL at 19:53

## 2017-07-24 RX ADMIN — DONEPEZIL HYDROCHLORIDE 10 MG: 10 TABLET, FILM COATED ORAL at 21:19

## 2017-07-24 RX ADMIN — ATORVASTATIN CALCIUM 10 MG: 10 TABLET, FILM COATED ORAL at 09:25

## 2017-07-24 RX ADMIN — LOSARTAN POTASSIUM 100 MG: 50 TABLET ORAL at 09:25

## 2017-07-24 RX ADMIN — NYSTATIN: 100000 POWDER TOPICAL at 09:27

## 2017-07-24 RX ADMIN — NYSTATIN: 100000 POWDER TOPICAL at 18:00

## 2017-07-24 NOTE — PROGRESS NOTES
Problem: Self Care Deficits Care Plan (Adult)  Goal: *Acute Goals and Plan of Care (Insert Text)  Occupational Therapy Goals  Initiated 7/22/2017  1. Patient will perform tolerate standing X 5 mins with minimal assistance within 7 day(s). 2. Patient will perform lower body dressing with supervision/set-up within 7 day(s). 3. Patient will perform grooming sitting EOB with supervision/set-up within 7 day(s). 4. Patient will perform toilet transfers with supervision/set-up within 7 day(s). 5. Patient will perform all aspects of toileting with supervision/set-up within 7 day(s). 6. Patient will participate in upper extremity therapeutic exercise/activities with supervision/set-up within 7 day(s). OCCUPATIONAL THERAPY TREATMENT  Patient: Joshua Bullard (89 y.o. female)  Date: 7/24/2017  Diagnosis: ICH (intracerebral hemorrhage) (HCC) SDH (subdural hematoma) (HCC)       Precautions: Bed Alarm, Fall  Chart, occupational therapy assessment, plan of care, and goals were reviewed. ASSESSMENT:  Based on the objective data below, the patient presents with impaired memory, cognition, safety, and command following. She demonstrates need for max to total assist for self care, but does assist to mobilize, transfer and self feed. Recommend 24/7 care. Recommend OOB in chair with bed alarm 3 times a day as tolerated and safe and participation in self care and self feeding with assist.     Progression toward goals:  [ ]          Improving appropriately and progressing toward goals  [X]          Improving slowly and progressing toward goals  [ ]          Not making progress toward goals and plan of care will be adjusted       PLAN:  Patient continues to benefit from skilled intervention to address the above impairments. Continue treatment per established plan of care. Discharge Recommendations:  None  Further Equipment Recommendations for Discharge:  24/7 assist       SUBJECTIVE:   Patient stated I'm so hungry.  OBJECTIVE DATA SUMMARY:   Cognitive/Behavioral Status:  Neurologic State: Lethargic  Orientation Level: Disoriented X4  Cognition: Memory loss;Decreased command following;Decreased attention/concentration; Impulsive; Impaired decision making  Perception: Appears intact  Perseveration: Perseverates during conversation (Perseverates on \"I'm cold\" and \"I'm hungry\")  Safety/Judgement: Decreased awareness of environment;Decreased insight into deficits; Decreased awareness of need for safety;Decreased awareness of need for assistance  Functional Mobility and Transfers for ADLs:              Bed Mobility:                            Transfers:           Balance:     ADL Intervention:  Feeding  Feeding Assistance:  (Nurse reports patient feeds self with set up, assist to initiate and assist for clean up of spillage)     Grooming  Washing Face: Maximum assistance  Washing Hands: Maximum assistance           Toileting  Toileting Assistance: Total assistance(dependent) (incontinent)     Cognitive Retraining  Problem Solving: Awareness of environment  Organizing/Sequencing: Breaking task down  Attention to Task: Distractibility  Safety/Judgement: Decreased awareness of environment;Decreased insight into deficits; Decreased awareness of need for safety;Decreased awareness of need for assistance  Cues: Tactile cues provided;Verbal cues provided;Visual cues provided           Activity Tolerance:    Fair  Please refer to the flowsheet for vital signs taken during this treatment.   After treatment:   [ ]  Patient left in no apparent distress sitting up in chair  [X]  Patient left in no apparent distress in bed  [X]  Call bell left within reach  [X]  Nursing notified  [ ]  Caregiver present  [X]  Bed alarm activated      COMMUNICATION/COLLABORATION:   The patients plan of care was discussed with: Registered Nurse     RADHA Rocha  Time Calculation: 12 mins

## 2017-07-24 NOTE — DISCHARGE SUMMARY
Discharge Summary       PATIENT ID: Anjelica Lees  MRN: [id number]   YOB: 1923    DATE OF ADMISSION: 7/19/2017  1:27 PM    DATE OF DISCHARGE: 7/24/17   PRIMARY CARE PROVIDER: Uri Prasad MD     ATTENDING PHYSICIAN: Leonardo Sandoval  DISCHARGING PROVIDER: Kane Ellison MD    To contact this individual call [phone] and ask the  to page. If unavailable ask to be transferred the Adult Hospitalist Department. CONSULTATIONS: IP CONSULT TO NEUROSURGERY  IP CONSULT TO NEUROSURGERY  IP CONSULT TO HOSPITALIST  IP CONSULT TO PALLIATIVE CARE - PROVIDER    PROCEDURES/SURGERIES: * No surgery found *    ADMITTING [address] [place] [address] COURSE:   80 y. o. female who presents with confusion per ER chart 80 y.o. female with past medical history significant for dementia, HTN, and psychiatric disorder who presented from assisted living facility via EMS with chief complaint of AMS.  Per EMS the patient was found altered by staff at the facility that she lives at this afternoon and was last seen at her baseline yesterday evening no toher history could be obtained and CT scan of head showed - Bilateral chronic subdural hygromas. Superimposed small right acute subdural hematoma versus prominent cortical vein Contrast CT recommended. Neurosurgery evaluated patient and recommended repeating CT head which was done today,showing stable subacute bilateral subdural hematomas. Nurse reports that family visited patient and stated that she was at her baseline. Palliative care has discussed code status with patient and she is now DNR         Assessment & Plan:      1-Subacute SDHs,chronic:    -Pt stable and seems at her baseline. CT head repeated today without any change.    -Neurosurgery involved and no intervention planned. Recommended hold on AC    -PT/OT   -Possible ICH on CT    -Not seen on repeated CT.    2-Uncontrolled HTN:    -Now controlled and cardene drip d/aleah    -On home meds,cozaar 100 mg po daily.    3-Psych disorder    -On multiple psych meds including ativan, trozadone. 4-Advanced dementia no encephalopathy    -On aricept. Ativan for agitation prn    5-SAMMIE    -Received iv fluid. Now resolved    6-Hypokalemia:    -Repleted.              PENDING TEST RESULTS:   At the time of discharge the following test results are still pending:     FOLLOW UP APPOINTMENTS:    Follow-up Information     Follow up With Details Comments Contact Info    Phys Other, MD  need to follow up with PCP in 1 week Patient can only remember the practice name and not the physician             ADDITIONAL CARE RECOMMENDATIONS:     DIET: Cardiac Diet  ACTIVITY: Activity as tolerated    WOUND CARE:     EQUIPMENT needed:       DISCHARGE MEDICATIONS:  Current Discharge Medication List      CONTINUE these medications which have CHANGED    Details   LORazepam (ATIVAN) 0.5 mg tablet Take 1 Tab by mouth two (2) times daily as needed (agitation) for up to 5 days. Max Daily Amount: 1 mg. Qty: 10 Tab, Refills: 0         CONTINUE these medications which have NOT CHANGED    Details   atorvastatin (LIPITOR) 10 mg tablet Take 10 mg by mouth every evening. Indications: hyperlipidemia      Menthol-Zinc Oxide (CALMOSEPTINE) 0.44-20.6 % oint Apply  to affected area three (3) times daily. donepezil (ARICEPT) 10 mg tablet Take 10 mg by mouth nightly. 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 losartan (COZAAR) 100 mg tablet Take 100 mg by mouth daily. Indications: hypertension      mirtazapine (REMERON) 15 mg tablet Take 7.5 mg by mouth nightly. Indications: major depressive disorder      traZODone (DESYREL) 50 mg tablet Take 25 mg by mouth nightly as needed. Indications: insomnia associated with depression      acetaminophen (TYLENOL) 325 mg tablet Take 650 mg by mouth every four (4) hours as needed for Pain.  Indications: Pain      loperamide (ANTI-DIARRHEAL, LOPERAMIDE,) 2 mg capsule Take 2 mg by mouth four (4) times daily as needed for Diarrhea. STOP taking these medications       citalopram (CELEXA) 20 mg tablet Comments:   Reason for Stopping:                 NOTIFY YOUR PHYSICIAN FOR ANY OF THE FOLLOWING:   Fever over 101 degrees for 24 hours. Chest pain, shortness of breath, fever, chills, nausea, vomiting, diarrhea, change in mentation, falling, weakness, bleeding. Severe pain or pain not relieved by medications. Or, any other signs or symptoms that you may have questions about. DISPOSITION:    Home With:   OT  PT  HH  RN      X Long term SNF/Inpatient Rehab    Independent/assisted living    Hospice    Other:       PATIENT CONDITION AT DISCHARGE:     Functional status   X Poor     Deconditioned     Independent      Cognition     Lucid     Forgetful    X Dementia      Catheters/lines (plus indication)    Sawyer     PICC     PEG    X None      Code status     Full code    X DNR      PHYSICAL EXAMINATION AT DISCHARGE:      Constitutional:  No acute distress,    ENT:  Oral mucous moist   Resp:  CTA bilaterally. CV:  Regular rhythm, normal rate    GI:  Soft, non distended, non tender. BS+    Musculoskeletal:  No edema, warm, 2+ pulses throughout    Neurologic:  Moves all extremities.         CHRONIC MEDICAL DIAGNOSES:  Problem List as of 7/24/2017  Date Reviewed: 7/24/2017          Codes Class Noted - Resolved    * (Principal)SDH (subdural hematoma) ([facility] ICD-10-CM: I62.00  ICD-9-CM: 432.1  7/19/2017 - Present        ICH (intracerebral hemorrhage) ([facility] ICD-10-CM: I61.9  ICD-9-CM: 565  7/19/2017 - Present              Greater than 30  minutes were spent with the patient on counseling and coordination of care    Signed:   Odin Bond MD  7/24/2017  1:29 PM

## 2017-07-24 NOTE — DISCHARGE INSTRUCTIONS
Discharge SNF/Rehab Instructions/LTAC       PATIENT ID: Anjum De Leon  MRN: [id number]   YOB: 1923    DATE OF ADMISSION: 7/19/2017  1:27 PM    DATE OF DISCHARGE: 7/24/2017    PRIMARY CARE PROVIDER: Brenda Davis MD       ATTENDING PHYSICIAN: Carrie Alan MD  DISCHARGING PROVIDER: Carrie Alan MD     To contact this individual call [phone] and ask the  to page. If unavailable ask to be transferred the Adult Hospitalist Department. CONSULTATIONS: IP CONSULT TO NEUROSURGERY  IP CONSULT TO NEUROSURGERY  IP CONSULT TO HOSPITALIST  IP CONSULT TO PALLIATIVE CARE - PROVIDER    PROCEDURES/SURGERIES: * No surgery found *    ADMITTING [address] [place] [address] COURSE:   80 y. o. female who presents with confusion per ER chart 80 y.o. female with past medical history significant for dementia, HTN, and psychiatric disorder who presented from assisted living facility via EMS with chief complaint of AMS.  Per EMS the patient was found altered by staff at the facility that she lives at this afternoon and was last seen at her baseline yesterday evening no toher history could be obtained and CT scan of head showed - Bilateral chronic subdural hygromas. Superimposed small right acute subdural hematoma versus prominent cortical vein Contrast CT recommended. Neurosurgery evaluated patient and recommended repeating CT head which was done today,showing stable subacute bilateral subdural hematomas. Nurse reports that family visited patient and stated that she was at her baseline. Palliative care has discussed code status with patient and she is now DNR         Assessment & Plan:      1-Subacute SDHs,chronic:    -Pt stable and seems at her baseline. CT head repeated today without any change.    -Neurosurgery involved and no intervention planned. Recommended hold on AC    -PT/OT   -Possible ICH on CT    -Not seen on repeated CT.    2-Uncontrolled HTN:    -Now controlled and cardene drip d/aleah    -On home meds,cozaar 100 mg po daily. 3-Psych disorder    -On multiple psych meds including ativan, trozadone. 4-Advanced dementia    -On aricept. Ativan for agitation prn    5-SAMMIE    -Received iv fluid. Now resolved    6-Hypokalemia:    -Repleted.              PENDING TEST RESULTS:   At the time of discharge the following test results are still pending:     FOLLOW UP APPOINTMENTS:    Follow-up Information     Follow up With Details Comments Contact Info    Annika Other, MD  need to follow up with PCP in 1 week Patient can only remember the practice name and not the physician             ADDITIONAL CARE RECOMMENDATIONS:     DIET: Cardiac Diet    TUBE FEEDING INSTRUCTIONS:     OXYGEN / BiPAP SETTINGS:     ACTIVITY: Activity as tolerated    WOUND CARE:     EQUIPMENT needed:       DISCHARGE MEDICATIONS:   See Medication Reconciliation Form      NOTIFY YOUR PHYSICIAN FOR ANY OF THE FOLLOWING:   Fever over 101 degrees for 24 hours. Chest pain, shortness of breath, fever, chills, nausea, vomiting, diarrhea, change in mentation, falling, weakness, bleeding. Severe pain or pain not relieved by medications. Or, any other signs or symptoms that you may have questions about. DISPOSITION:    Home With:   OT  PT  HH  RN      x SNF/Inpatient Rehab/LTAC    Independent/assisted living    Hospice    Other:       PATIENT CONDITION AT DISCHARGE:     Functional status   x Poor     Deconditioned     Independent      Cognition     Lucid     Forgetful    x Dementia      Catheters/lines (plus indication)    Sawyer     PICC     PEG    x None      Code status     Full code    x DNR      PHYSICAL EXAMINATION AT DISCHARGE:     Constitutional:  No acute distress,    ENT:  Oral mucous moist   Resp:  CTA bilaterally. CV:  Regular rhythm, normal rate    GI:  Soft, non distended, non tender. BS+    Musculoskeletal:  No edema, warm, 2+ pulses throughout    Neurologic:  Moves all extremities.           CHRONIC MEDICAL DIAGNOSES:  Problem List as of 7/24/2017  Date Reviewed: 7/24/2017          Codes Class Noted - Resolved    * (Principal)SDH (subdural hematoma) ([facility]) ICD-10-CM: I62.00  ICD-9-CM: 432.1  7/19/2017 - Present        ICH (intracerebral hemorrhage) ([facility] ICD-10-CM: I61.9  ICD-9-CM: 711  7/19/2017 - Present                CDMP Checked:   Yes x     PROBLEM LIST Updated:  Yes x         Signed:   Chinyere Mckeon MD  7/24/2017  1:27 PM

## 2017-07-24 NOTE — PROGRESS NOTES
Spoke with Teressa DE PAZ from [facility]. She is on her way to see/evaluate Ms. Jennifer Allen. Room number provided. Patient's [place] and [facility] staff are trying to coordinate transportation back to the facility. Clinical updates provided to Teressa verbally and clinical packet to be re-faxed to [phone]. Faxed Items did not go through on Friday 7/21. Patient has been seen by therapy and recommendations include home health for post acute transition. [facility] works with preferred providers. Referral to be made to [facility] via allscripts. Nursing on the unit updated. Chepe Mosqueda, RN      Update:  7/24 - [facility] does not take patient's insurance. Will reach out to another [facility] agency. **Patient will need an order for [facility] to [facility] [address] and OT.**    7/25 - Spoke with BALDEV Gannon at [facility] [employer] at 9:15am.  ( [phone])  The  is on his way to  the patient and transport back to [facility]. [facility] referral sent to  [address] [place] [address] who also does not take patient's insurance. Will try [facility] - another preferred provider of [place].

## 2017-07-24 NOTE — PROGRESS NOTES
Reviewed medical chart; patient is a resident of [address] [place] [address]. Called Q#[phone] and left a message for the Director of Nursing to confirm that they can accept the patient back into care today; awaiting a call back. Care Management will continue to follow her disposition.    CORINA Miller

## 2017-07-24 NOTE — PROGRESS NOTES
Occupational Therapy: Patient cleared by nurse, but being bathed at this time by PCT. Will follow and see as able and appropriate.   RADHA Ray/INNA

## 2017-07-24 NOTE — PROGRESS NOTES
Assumed care of pt this am. Pt is alert and oriented to self only resting in bed at this time. Pt needs assistance with meals, provided. Unclear from report where pt will go post discharge, to speak with MD today. Pt has displaced several IV's and no longer has one, per report NP aware. Pt has attempted several times to get up but hs been directed back to bed. Pt now resting. Report given to nurse Mecca Esteves using SBAR.

## 2017-07-25 VITALS
RESPIRATION RATE: 16 BRPM | HEIGHT: 66 IN | BODY MASS INDEX: 20.23 KG/M2 | HEART RATE: 72 BPM | OXYGEN SATURATION: 95 % | TEMPERATURE: 98.7 F | WEIGHT: 125.88 LBS | SYSTOLIC BLOOD PRESSURE: 188 MMHG | DIASTOLIC BLOOD PRESSURE: 79 MMHG

## 2017-07-25 PROCEDURE — 74011250637 HC RX REV CODE- 250/637: Performed by: INTERNAL MEDICINE

## 2017-07-25 RX ADMIN — ATORVASTATIN CALCIUM 10 MG: 10 TABLET, FILM COATED ORAL at 09:46

## 2017-07-25 RX ADMIN — NYSTATIN: 100000 POWDER TOPICAL at 09:47

## 2017-07-25 RX ADMIN — LORAZEPAM 0.5 MG: 0.5 TABLET ORAL at 09:47

## 2017-07-25 RX ADMIN — LOSARTAN POTASSIUM 100 MG: 50 TABLET ORAL at 09:46

## 2017-07-25 NOTE — CDMP QUERY
Please clarify if Encephalopathy has been ruled out  :    =>Encephalopathy ruled out in the setting of acute dementia/psych disorder  requiring continued psych meds   =>Other Explanation of clinical findings  =>Unable to Determine (no explanation of clinical findings)    The medical record reflects the following clinical findings, treatment, and risk factors:    Risk Factors:   Clinical Indicators:noted \"Encephalopathy\" on progress note 7/19      Psych disorder    -On multiple psych meds including ativan, trozadone.     Advanced dementia    -On aricept. Ativan for agitation prn  Treatment: continued psych meds    Please clarify and document your clinical opinion in the progress notes and discharge summary including the definitive and/or presumptive diagnosis, (suspected or probable), related to the above clinical findings. Please include clinical findings supporting your diagnosis.     Thank you,         Gabriella Hughes [address] [place] [address]

## 2017-07-25 NOTE — PROGRESS NOTES
Bedside and Verbal shift change report given to [address] [place] [address] (oncoming nurse) by Richar James RN (offgoing nurse). Report included the following information SBAR, Kardex, ED Summary, Procedure Summary, Intake/Output, MAR and Recent Results. Hospitalist paged to inform of 0700 BP. Pt is for discharge this AM at 0900 and has no IV access. Awaiting MD to call back.

## 2017-07-25 NOTE — PROGRESS NOTES
Bedside and Verbal shift change report given to [address] (oncoming nurse) by Real Christopher RN (offgoing nurse). Report included the following information SBAR, Kardex and Recent Results.

## 2019-09-19 ENCOUNTER — HOSPITAL ENCOUNTER (OUTPATIENT)
Age: 84
Setting detail: OBSERVATION
Discharge: OTHER INSTITUTION | End: 2019-09-19
Attending: EMERGENCY MEDICINE | Admitting: FAMILY MEDICINE
Payer: COMMERCIAL

## 2019-09-19 ENCOUNTER — APPOINTMENT (OUTPATIENT)
Dept: CT IMAGING | Age: 84
End: 2019-09-19
Attending: EMERGENCY MEDICINE
Payer: COMMERCIAL

## 2019-09-19 ENCOUNTER — APPOINTMENT (OUTPATIENT)
Dept: GENERAL RADIOLOGY | Age: 84
End: 2019-09-19
Attending: EMERGENCY MEDICINE
Payer: COMMERCIAL

## 2019-09-19 VITALS
DIASTOLIC BLOOD PRESSURE: 110 MMHG | HEART RATE: 59 BPM | OXYGEN SATURATION: 96 % | RESPIRATION RATE: 17 BRPM | SYSTOLIC BLOOD PRESSURE: 161 MMHG

## 2019-09-19 DIAGNOSIS — N39.0 URINARY TRACT INFECTION WITHOUT HEMATURIA, SITE UNSPECIFIED: ICD-10-CM

## 2019-09-19 DIAGNOSIS — G93.40 ACUTE ENCEPHALOPATHY: Primary | ICD-10-CM

## 2019-09-19 PROBLEM — R41.82 ALTERED MENTAL STATE: Status: ACTIVE | Noted: 2019-09-19

## 2019-09-19 LAB
ALBUMIN SERPL-MCNC: 3.4 G/DL (ref 3.5–5)
ALBUMIN/GLOB SERPL: 0.9 {RATIO} (ref 1.1–2.2)
ALP SERPL-CCNC: 75 U/L (ref 45–117)
ALT SERPL-CCNC: 13 U/L (ref 12–78)
ANION GAP SERPL CALC-SCNC: 5 MMOL/L (ref 5–15)
APPEARANCE UR: ABNORMAL
AST SERPL-CCNC: 10 U/L (ref 15–37)
BACTERIA URNS QL MICRO: ABNORMAL /HPF
BASOPHILS # BLD: 0.1 K/UL (ref 0–0.1)
BASOPHILS NFR BLD: 1 % (ref 0–1)
BILIRUB SERPL-MCNC: 0.7 MG/DL (ref 0.2–1)
BILIRUB UR QL: NEGATIVE
BUN SERPL-MCNC: 19 MG/DL (ref 6–20)
BUN/CREAT SERPL: 20 (ref 12–20)
CALCIUM SERPL-MCNC: 9 MG/DL (ref 8.5–10.1)
CHLORIDE SERPL-SCNC: 110 MMOL/L (ref 97–108)
CO2 SERPL-SCNC: 28 MMOL/L (ref 21–32)
COLOR UR: ABNORMAL
COMMENT, HOLDF: NORMAL
CREAT SERPL-MCNC: 0.94 MG/DL (ref 0.55–1.02)
DIFFERENTIAL METHOD BLD: ABNORMAL
EOSINOPHIL # BLD: 0.2 K/UL (ref 0–0.4)
EOSINOPHIL NFR BLD: 2 % (ref 0–7)
EPITH CASTS URNS QL MICRO: ABNORMAL /LPF
ERYTHROCYTE [DISTWIDTH] IN BLOOD BY AUTOMATED COUNT: 14.6 % (ref 11.5–14.5)
GLOBULIN SER CALC-MCNC: 3.6 G/DL (ref 2–4)
GLUCOSE SERPL-MCNC: 81 MG/DL (ref 65–100)
GLUCOSE UR STRIP.AUTO-MCNC: NEGATIVE MG/DL
HCT VFR BLD AUTO: 42 % (ref 35–47)
HGB BLD-MCNC: 13.1 G/DL (ref 11.5–16)
HGB UR QL STRIP: ABNORMAL
HYALINE CASTS URNS QL MICRO: ABNORMAL /LPF (ref 0–5)
IMM GRANULOCYTES # BLD AUTO: 0 K/UL (ref 0–0.04)
IMM GRANULOCYTES NFR BLD AUTO: 1 % (ref 0–0.5)
KETONES UR QL STRIP.AUTO: NEGATIVE MG/DL
LEUKOCYTE ESTERASE UR QL STRIP.AUTO: ABNORMAL
LYMPHOCYTES # BLD: 3.1 K/UL (ref 0.8–3.5)
LYMPHOCYTES NFR BLD: 35 % (ref 12–49)
MAGNESIUM SERPL-MCNC: 2.1 MG/DL (ref 1.6–2.4)
MCH RBC QN AUTO: 29.1 PG (ref 26–34)
MCHC RBC AUTO-ENTMCNC: 31.2 G/DL (ref 30–36.5)
MCV RBC AUTO: 93.3 FL (ref 80–99)
MONOCYTES # BLD: 0.7 K/UL (ref 0–1)
MONOCYTES NFR BLD: 8 % (ref 5–13)
NEUTS SEG # BLD: 4.6 K/UL (ref 1.8–8)
NEUTS SEG NFR BLD: 53 % (ref 32–75)
NITRITE UR QL STRIP.AUTO: POSITIVE
NRBC # BLD: 0 K/UL (ref 0–0.01)
NRBC BLD-RTO: 0 PER 100 WBC
PH UR STRIP: 6 [PH] (ref 5–8)
PLATELET # BLD AUTO: 232 K/UL (ref 150–400)
PMV BLD AUTO: 11.5 FL (ref 8.9–12.9)
POTASSIUM SERPL-SCNC: 4.1 MMOL/L (ref 3.5–5.1)
PROT SERPL-MCNC: 7 G/DL (ref 6.4–8.2)
PROT UR STRIP-MCNC: NEGATIVE MG/DL
RBC # BLD AUTO: 4.5 M/UL (ref 3.8–5.2)
RBC #/AREA URNS HPF: ABNORMAL /HPF (ref 0–5)
SAMPLES BEING HELD,HOLD: NORMAL
SODIUM SERPL-SCNC: 143 MMOL/L (ref 136–145)
SP GR UR REFRACTOMETRY: 1.02 (ref 1–1.03)
UR CULT HOLD, URHOLD: NORMAL
UROBILINOGEN UR QL STRIP.AUTO: 0.2 EU/DL (ref 0.2–1)
WBC # BLD AUTO: 8.7 K/UL (ref 3.6–11)
WBC URNS QL MICRO: ABNORMAL /HPF (ref 0–4)

## 2019-09-19 PROCEDURE — 87186 SC STD MICRODIL/AGAR DIL: CPT

## 2019-09-19 PROCEDURE — 36415 COLL VENOUS BLD VENIPUNCTURE: CPT

## 2019-09-19 PROCEDURE — 85025 COMPLETE CBC W/AUTO DIFF WBC: CPT

## 2019-09-19 PROCEDURE — 96365 THER/PROPH/DIAG IV INF INIT: CPT

## 2019-09-19 PROCEDURE — 96366 THER/PROPH/DIAG IV INF ADDON: CPT

## 2019-09-19 PROCEDURE — 83735 ASSAY OF MAGNESIUM: CPT

## 2019-09-19 PROCEDURE — 87086 URINE CULTURE/COLONY COUNT: CPT

## 2019-09-19 PROCEDURE — 74011000258 HC RX REV CODE- 258: Performed by: EMERGENCY MEDICINE

## 2019-09-19 PROCEDURE — 74011250636 HC RX REV CODE- 250/636: Performed by: EMERGENCY MEDICINE

## 2019-09-19 PROCEDURE — 99284 EMERGENCY DEPT VISIT MOD MDM: CPT

## 2019-09-19 PROCEDURE — 99218 HC RM OBSERVATION: CPT

## 2019-09-19 PROCEDURE — 80053 COMPREHEN METABOLIC PANEL: CPT

## 2019-09-19 PROCEDURE — 81001 URINALYSIS AUTO W/SCOPE: CPT

## 2019-09-19 PROCEDURE — 70450 CT HEAD/BRAIN W/O DYE: CPT

## 2019-09-19 PROCEDURE — 71046 X-RAY EXAM CHEST 2 VIEWS: CPT

## 2019-09-19 PROCEDURE — 87077 CULTURE AEROBIC IDENTIFY: CPT

## 2019-09-19 RX ORDER — CITALOPRAM 20 MG/1
20 TABLET, FILM COATED ORAL EVERY EVENING
COMMUNITY

## 2019-09-19 RX ORDER — MENTHOL AND ZINC OXIDE .44; 20.625 G/100G; G/100G
OINTMENT TOPICAL AS NEEDED
COMMUNITY

## 2019-09-19 RX ORDER — QUETIAPINE FUMARATE 25 MG/1
25 TABLET, FILM COATED ORAL 2 TIMES DAILY
COMMUNITY

## 2019-09-19 RX ORDER — LOSARTAN POTASSIUM 25 MG/1
25 TABLET ORAL DAILY
COMMUNITY

## 2019-09-19 RX ORDER — MIRTAZAPINE 15 MG/1
15 TABLET, FILM COATED ORAL
COMMUNITY

## 2019-09-19 RX ADMIN — CEFTRIAXONE 1 G: 1 INJECTION, POWDER, FOR SOLUTION INTRAMUSCULAR; INTRAVENOUS at 14:46

## 2019-09-19 NOTE — ED NOTES
5:05 PM 
I talked with the transfer center arranging transfer for the pt. The delay in transfer was due to not knowing where the pt would like to be transferred to.  The pt will be transferred to [facility].

## 2019-09-19 NOTE — ED PROVIDER NOTES
80 y.o. female with past medical history significant for HTN, dementia, presents via EMS for evaluation of altered mental status. Patient was last seen at her baseline mentation at 2000 last night by staff at her facility. Patient was seen ambulating with her walker to breakfast. Afterwards she went to a hair appointment, and while there she was found to be altered. Staff appreciated that patient was \"wobbly\" on her walker and refused to respond to questions. Staff told EMS that this is \"completely unlike her\". Patient has a known history of dementia, but EMS states that patient was reported to be more altered than her baseline by staff at the facility. At baseline, patient is ambulatory and is able to speak. Patient not speaking or answering questions in the ED. HPI limited due to patient's mental status change. Social hx: Patient is a resident of the [facility]. PCP: Annika Michele MD    Full history, physical exam, and ROS unable to be obtained due to:  dementia and confusion. Note written by Randall Orlando. Tyler Rosa, as dictated by Jeffy Herrera MD 12:04 PM    The history is provided by the patient. The history is limited by the condition of the patient (Dementia, Altered Mental Status). No  was used. Past Medical History:   Diagnosis Date    Dementia 2017    Hypertension     Psychiatric disorder        No past surgical history on file. No family history on file.     Social History     Socioeconomic History    Marital status:      Spouse name: Not on file    Number of children: Not on file    Years of education: Not on file    Highest education level: Not on file   Occupational History    Not on file   Social Needs    Financial resource strain: Not on file    Food insecurity:     Worry: Not on file     Inability: Not on file    Transportation needs:     Medical: Not on file     Non-medical: Not on file   Tobacco Use    Smoking status: Not on file   Substance and Sexual Activity    Alcohol use: No    Drug use: Not on file    Sexual activity: Not on file   Lifestyle    Physical activity:     Days per week: Not on file     Minutes per session: Not on file    Stress: Not on file   Relationships    Social connections:     Talks on phone: Not on file     Gets together: Not on file     Attends Jain service: Not on file     Active member of club or organization: Not on file     Attends meetings of clubs or organizations: Not on file     Relationship status: Not on file    Intimate partner violence:     Fear of current or ex partner: Not on file     Emotionally abused: Not on file     Physically abused: Not on file     Forced sexual activity: Not on file   Other Topics Concern    Not on file   Social History Narrative    Not on file         ALLERGIES: Patient has no known allergies. Review of Systems   Unable to perform ROS: Other (Dementia, Altered Mental Status)       Vitals:    09/19/19 1206 09/19/19 1230   BP: (!) 176/98    Pulse: (!) 54 (!) 57   Resp: 15 26   SpO2: 94% 96%            Physical Exam   Constitutional: She appears well-developed and well-nourished. No distress. Intermittently decreased effort with exam.   HENT:   Head: Normocephalic and atraumatic. Eyes: Conjunctivae are normal.   Neck: Neck supple. Cardiovascular: Normal rate and regular rhythm. Pulmonary/Chest: Effort normal. No respiratory distress. Abdominal: She exhibits no distension. Musculoskeletal: Normal range of motion. She exhibits no deformity. Neurological: She is alert. Appears slowed. Minimal speech. Good strength in the bilateral upper and bilateral lower extremities. Disoriented at baseline. Skin: Skin is warm and dry. Nursing note and vitals reviewed. Note written by Vijay Gibbs.  Jessica Matamoros, as dictated by Leoncio Alonzo MD 12:04 PM     MDM     77-year-old female presents with encephalopathy that occurred after breakfast this morning. She is slowed, difficult to arouse, and inappropriate compared to her stated baseline from the facility where she normally talks and is ambulatory. Work-up is notable for apparent urinary tract infection. Covered empirically with Rocephin. Procedures    Hospitalist Jackson for Admission  1:21 PM    ED Room Number: ER26/26  Patient Name and age: Jerica Contreras 80 y.o.  female  Working Diagnosis:   1. Acute encephalopathy    2. Urinary tract infection without hematuria, site unspecified      Readmission: no  Isolation Requirements:  no  Recommended Level of Care:  telemetry  Code Status:  Full    CONSULT NOTE:  1:27 PM Jennie Jhaveri MD communicated with Dr. Britta Live for Hospitalist via [facility]. Discussed available diagnostic tests and clinical findings. Dr. Jazmyne iMles will evaluate the patient for admission to the hospital.       PROGRESS NOTE:  2:06 PM  Patient noted to have [address] [place] [address] insurance. Will initiate transfer process to an [facility] facility.

## 2019-09-19 NOTE — ED TRIAGE NOTES
Pt arrives via EMS from [facility] Pt is usaully alert and riented, hisjanusz f dementia. Seen last night at approx 2000, went to breakfast (no report of mentation then) but went to get hair done at salon in building after breadkfast and becamed more altered, stafff reoprts pt had unsteady gait and refused tyo respond. Staff called 911. Pt has history of dementia HTN. Pt VSS,  per EMS. Pt arrives with arms crossed over chest non-verbal, grunting.

## 2019-09-19 NOTE — PROGRESS NOTES
Admission Medication Reconciliation:    Information obtained from:  [facility] [address] available¹:  NO    Comments/Recommendations: Updated PTA meds/reviewed patient's allergies. 1)  Reviewed transfer paperwork. Changes made to PTA medication list as follows:    2)  Medication changes (since last review): Added  - citalopram, quetiapine    Adjusted  - losartan (from 100 mg to 25 mg daily)  -mirtazapine (from 7.5 mg to 15 mg QPM)    Removed  - atorvastatin, donepezil, folic acid, loperamide, trazodone         [facility] reflects medications filled and processed through the patient's insurance, however   this data does NOT capture whether the medication was picked up or is currently being taken by the patient. Allergies:  Patient has no known allergies. Significant PMH/Disease States:   Past Medical History:   Diagnosis Date    Dementia 2017    Hypertension     Psychiatric disorder      Chief Complaint for this Admission:    Chief Complaint   Patient presents with    Altered mental status     Prior to Admission Medications:   Prior to Admission Medications   Prescriptions Last Dose Informant Patient Reported? Taking? QUEtiapine (SEROQUEL) 25 mg tablet 9/19/2019  Yes Yes   Sig: Take 25 mg by mouth two (2) times a day. acetaminophen (TYLENOL) 325 mg tablet   Yes Yes   Sig: Take 650 mg by mouth every four (4) hours as needed for Pain. Indications: Pain   citalopram (CELEXA) 20 mg tablet   Yes Yes   Sig: Take 20 mg by mouth every evening. losartan (COZAAR) 25 mg tablet 9/19/2019  Yes Yes   Sig: Take 25 mg by mouth daily. menthol-zinc oxide (CALMOSEPTINE) 0.44-20.6 % oint   Yes Yes   Sig: Apply  to affected area as needed (Apply to buttocks/groin every shift for skin protection as needed). mirtazapine (REMERON) 15 mg tablet   Yes Yes   Sig: Take 15 mg by mouth nightly.       Facility-Administered Medications: None       Please contact the main inpatient pharmacy with any questions or concerns at [phone] and we will direct you to the clinical pharmacist covering this patient's care while in-house.    Clary Farmer, PHARMD

## 2019-09-19 NOTE — ED NOTES
RN spoke with pt son, Richard Villa, for permission and choice of facility for transfer. Pt son also verbally verified on phone with Louie Hernandez RN for permission to transfer facilities Son chose [facility]. Provider notified.

## 2019-09-20 NOTE — ED NOTES
Discharge instructions given to EMS by nurse. IV D/C. Pt wheeled off of unit in no signs of distress.

## 2019-09-20 NOTE — ED NOTES
Report called to [facility] EM[facility] RN 5th floor unit. Report included SBAR, MAR, pt status. Pt stable at time of transfer, VSS.

## 2019-09-20 NOTE — ED NOTES
[facility] transport in unit. Pt stable at time of transfer, pt tolerated move to stretcher well, linens and pt cleaned.

## 2019-09-21 LAB
BACTERIA SPEC CULT: ABNORMAL
CC UR VC: ABNORMAL
SERVICE CMNT-IMP: ABNORMAL

## 2019-09-21 NOTE — PROGRESS NOTES
Spoke with patient son. Pt was discharged from [facility] EMERGENC[facility] [place]. Pt was discharged home with antibiotics. Pansensitive.  Son unsure of antibiotic name
